# Patient Record
Sex: FEMALE | Race: WHITE | NOT HISPANIC OR LATINO | URBAN - METROPOLITAN AREA
[De-identification: names, ages, dates, MRNs, and addresses within clinical notes are randomized per-mention and may not be internally consistent; named-entity substitution may affect disease eponyms.]

---

## 2018-01-14 ENCOUNTER — INPATIENT (INPATIENT)
Facility: HOSPITAL | Age: 68
LOS: 3 days | Discharge: HOME | End: 2018-01-18
Attending: INTERNAL MEDICINE

## 2018-01-14 DIAGNOSIS — I10 ESSENTIAL (PRIMARY) HYPERTENSION: ICD-10-CM

## 2018-01-14 DIAGNOSIS — D49.6 NEOPLASM OF UNSPECIFIED BEHAVIOR OF BRAIN: ICD-10-CM

## 2018-01-14 DIAGNOSIS — E11.9 TYPE 2 DIABETES MELLITUS WITHOUT COMPLICATIONS: ICD-10-CM

## 2018-01-14 DIAGNOSIS — E78.5 HYPERLIPIDEMIA, UNSPECIFIED: ICD-10-CM

## 2018-01-14 DIAGNOSIS — R56.9 UNSPECIFIED CONVULSIONS: ICD-10-CM

## 2018-01-14 DIAGNOSIS — C34.90 MALIGNANT NEOPLASM OF UNSPECIFIED PART OF UNSPECIFIED BRONCHUS OR LUNG: ICD-10-CM

## 2018-01-22 DIAGNOSIS — J44.9 CHRONIC OBSTRUCTIVE PULMONARY DISEASE, UNSPECIFIED: ICD-10-CM

## 2018-01-22 DIAGNOSIS — R41.82 ALTERED MENTAL STATUS, UNSPECIFIED: ICD-10-CM

## 2018-01-22 DIAGNOSIS — E87.1 HYPO-OSMOLALITY AND HYPONATREMIA: ICD-10-CM

## 2018-01-22 DIAGNOSIS — Z98.2 PRESENCE OF CEREBROSPINAL FLUID DRAINAGE DEVICE: ICD-10-CM

## 2018-01-22 DIAGNOSIS — I10 ESSENTIAL (PRIMARY) HYPERTENSION: ICD-10-CM

## 2018-01-22 DIAGNOSIS — G40.909 EPILEPSY, UNSPECIFIED, NOT INTRACTABLE, WITHOUT STATUS EPILEPTICUS: ICD-10-CM

## 2018-01-22 DIAGNOSIS — E83.42 HYPOMAGNESEMIA: ICD-10-CM

## 2018-01-22 DIAGNOSIS — R50.9 FEVER, UNSPECIFIED: ICD-10-CM

## 2018-01-22 DIAGNOSIS — G93.40 ENCEPHALOPATHY, UNSPECIFIED: ICD-10-CM

## 2018-01-22 DIAGNOSIS — E11.9 TYPE 2 DIABETES MELLITUS WITHOUT COMPLICATIONS: ICD-10-CM

## 2018-02-08 ENCOUNTER — INPATIENT (INPATIENT)
Facility: HOSPITAL | Age: 68
LOS: 0 days | Discharge: HOME | End: 2018-02-09
Attending: INTERNAL MEDICINE

## 2018-02-08 VITALS
RESPIRATION RATE: 16 BRPM | SYSTOLIC BLOOD PRESSURE: 135 MMHG | DIASTOLIC BLOOD PRESSURE: 75 MMHG | TEMPERATURE: 97 F | HEART RATE: 68 BPM

## 2018-02-08 DIAGNOSIS — Z98.890 OTHER SPECIFIED POSTPROCEDURAL STATES: Chronic | ICD-10-CM

## 2018-02-08 DIAGNOSIS — R56.9 UNSPECIFIED CONVULSIONS: ICD-10-CM

## 2018-02-08 DIAGNOSIS — I10 ESSENTIAL (PRIMARY) HYPERTENSION: ICD-10-CM

## 2018-02-08 DIAGNOSIS — Z98.2 PRESENCE OF CEREBROSPINAL FLUID DRAINAGE DEVICE: Chronic | ICD-10-CM

## 2018-02-08 DIAGNOSIS — E11.9 TYPE 2 DIABETES MELLITUS WITHOUT COMPLICATIONS: ICD-10-CM

## 2018-02-08 DIAGNOSIS — Z90.2 ACQUIRED ABSENCE OF LUNG [PART OF]: Chronic | ICD-10-CM

## 2018-02-08 RX ORDER — DEXTROSE 50 % IN WATER 50 %
1 SYRINGE (ML) INTRAVENOUS ONCE
Qty: 0 | Refills: 0 | Status: DISCONTINUED | OUTPATIENT
Start: 2018-02-08 | End: 2018-02-09

## 2018-02-08 RX ORDER — ATORVASTATIN CALCIUM 80 MG/1
40 TABLET, FILM COATED ORAL AT BEDTIME
Qty: 0 | Refills: 0 | Status: DISCONTINUED | OUTPATIENT
Start: 2018-02-08 | End: 2018-02-09

## 2018-02-08 RX ORDER — DEXTROSE 50 % IN WATER 50 %
25 SYRINGE (ML) INTRAVENOUS ONCE
Qty: 0 | Refills: 0 | Status: DISCONTINUED | OUTPATIENT
Start: 2018-02-08 | End: 2018-02-09

## 2018-02-08 RX ORDER — HEPARIN SODIUM 5000 [USP'U]/ML
5000 INJECTION INTRAVENOUS; SUBCUTANEOUS EVERY 8 HOURS
Qty: 0 | Refills: 0 | Status: DISCONTINUED | OUTPATIENT
Start: 2018-02-08 | End: 2018-02-09

## 2018-02-08 RX ORDER — GLUCAGON INJECTION, SOLUTION 0.5 MG/.1ML
1 INJECTION, SOLUTION SUBCUTANEOUS ONCE
Qty: 0 | Refills: 0 | Status: DISCONTINUED | OUTPATIENT
Start: 2018-02-08 | End: 2018-02-09

## 2018-02-08 RX ORDER — ACETAMINOPHEN 500 MG
650 TABLET ORAL EVERY 6 HOURS
Qty: 0 | Refills: 0 | Status: DISCONTINUED | OUTPATIENT
Start: 2018-02-08 | End: 2018-02-09

## 2018-02-08 RX ORDER — INFLUENZA VIRUS VACCINE 15; 15; 15; 15 UG/.5ML; UG/.5ML; UG/.5ML; UG/.5ML
0.5 SUSPENSION INTRAMUSCULAR ONCE
Qty: 0 | Refills: 0 | Status: COMPLETED | OUTPATIENT
Start: 2018-02-08 | End: 2018-02-08

## 2018-02-08 RX ORDER — SODIUM CHLORIDE 9 MG/ML
1000 INJECTION, SOLUTION INTRAVENOUS
Qty: 0 | Refills: 0 | Status: DISCONTINUED | OUTPATIENT
Start: 2018-02-08 | End: 2018-02-09

## 2018-02-08 RX ORDER — LEVETIRACETAM 250 MG/1
250 TABLET, FILM COATED ORAL EVERY 12 HOURS
Qty: 0 | Refills: 0 | Status: DISCONTINUED | OUTPATIENT
Start: 2018-02-08 | End: 2018-02-09

## 2018-02-08 RX ORDER — METFORMIN HYDROCHLORIDE 850 MG/1
1000 TABLET ORAL
Qty: 0 | Refills: 0 | Status: DISCONTINUED | OUTPATIENT
Start: 2018-02-08 | End: 2018-02-09

## 2018-02-08 RX ORDER — INSULIN LISPRO 100/ML
VIAL (ML) SUBCUTANEOUS
Qty: 0 | Refills: 0 | Status: DISCONTINUED | OUTPATIENT
Start: 2018-02-08 | End: 2018-02-09

## 2018-02-08 RX ORDER — DEXTROSE 50 % IN WATER 50 %
12.5 SYRINGE (ML) INTRAVENOUS ONCE
Qty: 0 | Refills: 0 | Status: DISCONTINUED | OUTPATIENT
Start: 2018-02-08 | End: 2018-02-09

## 2018-02-08 RX ADMIN — HEPARIN SODIUM 5000 UNIT(S): 5000 INJECTION INTRAVENOUS; SUBCUTANEOUS at 18:48

## 2018-02-08 RX ADMIN — LEVETIRACETAM 250 MILLIGRAM(S): 250 TABLET, FILM COATED ORAL at 22:40

## 2018-02-08 NOTE — H&P ADULT - FAMILY HISTORY
Father  Still living? No  Family history of brain cancer, Age at diagnosis: Age Unknown     Mother  Still living? Unknown  Family history of lung cancer, Age at diagnosis: Age Unknown Father  Still living? No  Family history of brain cancer, Age at diagnosis: Age Unknown     Mother  Still living? No  Family history of lung cancer, Age at diagnosis: Age Unknown

## 2018-02-08 NOTE — H&P ADULT - HISTORY OF PRESENT ILLNESS
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  According to the son, patient has been having seizures since the year 2000, when the colloidal cyst was initially diagnosed. She remained stable on AED until 2005 when she had another seizure (colloidal cyst had increased in size, requiring surgery and subsequent placement of a VPS). She was maintained on Keppra and has been well until 2 weeks ago when she had stiffening of the left side of her body, recurrent episodes of Absence seizures, thus was admitted for VEEG for further evaluation.

## 2018-02-08 NOTE — H&P ADULT - NSHPSOCIALHISTORY_GEN_ALL_CORE
Lives at home with family: son.    No history of illicit drug use.  Denies Alcohol use.  Former smoker.

## 2018-02-08 NOTE — CONSULT NOTE ADULT - SUBJECTIVE AND OBJECTIVE BOX
CELINE MAURICIO     67y     Female    [id number]                                                           CC:Patient is a 67y old  Female who presents with a chief complaint of Admitted for VEEG (2018 12:02)      HPI:  History obtained from patient and son Rex [phone]  In  pt underwent right craniotomy for colloidal cyst resection. Pt was doing great until  when had a witnessed GTC seizure. Pt needed a revision, and underwent another resective surgery at that time. Pt was put on Keppra , then  in  V-P shunt was placed. Pt stopped taking Keppra about 5 years ago. In 2018 pt was admitted to [facility] for confusion/fever. That morning reportedly pt woke up confused. As the day progressed confusion got worse, and patient had intermittent left arm posturing and left-sided weakness. REEG was abnormal (see report), and patient was put on Keppra that she continues taking. Patient is tolerating Keppra well, compliant.    FAMILY HISTORY:  Family history of lung cancer (Mother): Mother: .  Family history of brain cancer (Father): Father; .      Vital Signs Last 24 Hrs  T(C): 36.1 (2018 12:44), Max: 36.1 (2018 12:44)  T(F): 97 (2018 12:44), Max: 97 (2018 12:44)  HR: 68 (2018 12:44) (68 - 68)  BP: 135/75 (2018 12:44) (135/75 - 135/75)  BP(mean): --  RR: 16 (2018 12:44) (16 - 16)  SpO2: --      Neuro Exam:  Orientation: oriented to person, place and time.   Attention: normal concentrating ability and visual attention was not decreased, follows 3-step directions  Language: no difficulty naming common objects, no difficulty repeating a phrase, no difficulty writing a sentence, fluency intact, comprehension intact.   Cranial Nerves: pupils equal round and reactive to light, extraocular motion intact, facial sensation intact symmetrically, face symmetrical, hearing was intact bilaterally, tongue and palate midline, head turning and shoulder shrug symmetric and there was no tongue deviation with protrusion.  Left visual field cut noted.  Motor: muscle tone was normal in all four extremities, muscle strength was normal in all four extremities and normal bulk in all four extremities.   Sensory exam: light touch was intact.   Cerebellar:  No pronator drift, FTN/HTS intact  Ambulating independently with some guarding of left LE, steady  Deep tendon reflexes: 2+, symmetrical      Allergies    codeine (Unknown)      Home Medications:  atorvastatin 40 mg oral tablet: 1 tab(s) orally once a day (2018 12:46)  glimepiride 4 mg oral tablet: 1 tab(s) orally once a day (2018 12:46)  Januvia 50 mg oral tablet: 1 tab(s) orally once a day (2018 12:46)  Keppra 250 mg oral tablet: 1 tab(s) orally 2 times a day (2018 12:46)  metFORMIN 1000 mg oral tablet: 1 tab(s) orally 2 times a day (2018 12:46)  valsartan 160 mg oral capsule: 1 cap(s) orally once a day (2018 12:46)  Norvasc 10 mg QHS      REEG 01/15/2018:  mild right fronto-central slowing  right fronto-central sharps-spikes    Neuro Imaging:  CT head 18  s/p right frontal and left fronto-parietal craniotomies  Stable left transfrontal ventricular shunt catheter  Stable encephalomalacia underlying right frontal lobe and bilateral occipital lobes  Chronic microvascular ischemic changes    Assessment: 67-year-old female with history of cilateral craniotomies for colloidal cyst resection, seizures, V-P shunt, CELINE MAURICIO     67y     Female    [id number]                                                           CC:  Patient is a 67y old  Female who presents for VEEG (2018 12:02)      HPI:  History obtained from patient and son Rex [phone]  In  pt underwent right craniotomy for colloidal cyst resection. Pt was doing great until  when had a witnessed GTC seizure. Pt needed a revision, and underwent another resective surgery at that time. Pt was put on Keppra , then  in  V-P shunt was placed. Pt stopped taking Keppra about 5 years ago. In 2018 pt was admitted to [facility] for confusion/fever. That morning reportedly pt woke up confused. As the day progressed confusion got worse, and patient had intermittent left arm posturing and left-sided weakness. REEG was abnormal (see report), and patient was put on Keppra that she continues taking. Patient is tolerating Keppra well, compliant, no other confusional episodes reported.    FAMILY HISTORY:  Family history of lung cancer (Mother): Mother: .  Family history of brain cancer (Father): Father; .      PMH:  See HPI  Diabetes mellitus    Hypertension    DLD        Past Surgical History:  History of craniotomy    S/P ventriculoperitoneal shunt    Status post partial lobectomy of lung.      Vital Signs Last 24 Hrs  T(C): 36.1 (2018 12:44), Max: 36.1 (2018 12:44)  T(F): 97 (2018 12:44), Max: 97 (2018 12:44)  HR: 68 (2018 12:44) (68 - 68)  BP: 135/75 (2018 12:44) (135/75 - 135/75)  BP(mean): --  RR: 16 (2018 12:44) (16 - 16)  SpO2: --      Neuro Exam:  Orientation: oriented to person, place and time.   Attention: normal concentrating ability and visual attention was not decreased, follows 3-step directions  Language: no difficulty naming common objects, no difficulty repeating a phrase, fluency intact, comprehension intact.   Cranial Nerves: pupils equal round and reactive to light, extraocular motion intact, facial sensation intact symmetrically, face symmetrical, hearing was intact bilaterally, tongue and palate midline, head turning and shoulder shrug symmetric and there was no tongue deviation with protrusion.  Left visual field cut noted.  Motor: muscle tone was normal in all four extremities, muscle strength was normal in all four extremities and normal bulk in all four extremities.   Sensory exam: decreased sensation on left side  Cerebellar:  No pronator drift, FTN/HTS intact  Ambulating independently with some guarding of left LE, steady  Deep tendon reflexes: 2+, symmetrical      Allergies    codeine (Unknown)      Home Medications:  atorvastatin 40 mg oral tablet: 1 tab(s) orally once a day (2018 12:46)  glimepiride 4 mg oral tablet: 1 tab(s) orally once a day (2018 12:46)  Januvia 50 mg oral tablet: 1 tab(s) orally once a day (2018 12:46)  Keppra 250 mg oral tablet: 1 tab(s) orally 2 times a day (2018 12:46)  metFORMIN 1000 mg oral tablet: 1 tab(s) orally 2 times a day (2018 12:46)  valsartan 160 mg oral capsule: 1 cap(s) orally once a day (2018 12:46)  Norvasc 10 mg QHS      REEG 01/15/2018:  mild right fronto-central slowing  right fronto-central sharps-spikes    Neuro Imaging:  CT head 18  s/p right frontal and left fronto-parietal craniotomies  Stable left transfrontal ventricular shunt catheter  Stable encephalomalacia underlying right frontal lobe and bilateral occipital lobes  Chronic microvascular ischemic changes      Assessment: 67-year-old left-handed female with history of two craniotomies for colloidal cyst resection, seizures, V-P shunt, Diabetes mellitus type 2, Hypertension, partial lobectomy of lung, s/p recent hospitalization for altered mental status when was started on Keppra.      Plan:  VEEG for better characterization and treatment plan  seizure precautions  Continue home dose of Keppra 250mg q12hrs (ordered)  check CBC, CMP, Mg, Keppra level trough  Kepp Mg above 2.0  Ativan 2mg IV PRN for GTC seizure lasting longer than 2 minutes ot two consecutive seizures without return to baseline in-between

## 2018-02-08 NOTE — H&P ADULT - NSHPPHYSICALEXAM_GEN_ALL_CORE
PHYSICAL EXAM:      Constitutional: well looking, in no acute distress.    Eyes: Left eye blindness.    ENMT: No oropharyngeal exudates or erythema, no sinus tenderness.    Neck: No JVD, Thyromegaly, lymphadenopathy or neck stiffness.    Respiratory: clear to auscultation bilaterally. No wheezing, rales or rubs heard.    Cardiovascular: Heart sounds 1 and 2 present, RRR.    Gastrointestinal: soft non-tender, non-distended, no masses palpated. BS present.    Extremities: No edema, clubbing or cyanosis, Pulses 2+.    Neurological: Awake and Alert, oriented x 3, no focal neurological deficits.

## 2018-02-08 NOTE — H&P ADULT - NSHPREVIEWOFSYSTEMS_GEN_ALL_CORE
REVIEW OF SYSTEMS      General:	    Skin/Breast:  	  Ophthalmologic:  	  ENMT:	    Respiratory and Thorax:  	  Cardiovascular:	    Gastrointestinal:	    Genitourinary:	    Musculoskeletal:	    Neurological:	    Psychiatric:	    Hematology/Lymphatics:	    Endocrine:	    Allergic/Immunologic: REVIEW OF SYSTEMS      General:	No fevers, chills, fatigue, weight loss.    Ophthalmologic: left eye blindness, no new visual changes or eye pain noted.  	  ENMT: No sore throat, ear pain, hearing loss.    Respiratory and Thorax: No dyspnea, cough, chest pain, sputum production, wheezing.  	  Cardiovascular: No PND, pedal edema, palpitations, orthopnea.	    Gastrointestinal:	No abdominal pain, nausea/vomiting, anorexia, change in bowel habits.    Genitourinary:	No dysuria, nocturia, frequency, back pain.    Musculoskeletal:	No joint pain or swelling.    Neurological:	No focal deficits, sensory loss or AMS.    Psychiatric:	No depression/anxiety.

## 2018-02-09 VITALS
RESPIRATION RATE: 16 BRPM | DIASTOLIC BLOOD PRESSURE: 65 MMHG | HEART RATE: 60 BPM | TEMPERATURE: 96 F | SYSTOLIC BLOOD PRESSURE: 142 MMHG

## 2018-02-09 DIAGNOSIS — E83.42 HYPOMAGNESEMIA: ICD-10-CM

## 2018-02-09 LAB
ALBUMIN SERPL ELPH-MCNC: 3.9 G/DL — SIGNIFICANT CHANGE UP (ref 3–5.5)
ALP SERPL-CCNC: 80 U/L — SIGNIFICANT CHANGE UP (ref 30–115)
ALT FLD-CCNC: 68 U/L — HIGH (ref 0–41)
ANION GAP SERPL CALC-SCNC: 13 MMOL/L — SIGNIFICANT CHANGE UP (ref 7–14)
AST SERPL-CCNC: 37 U/L — SIGNIFICANT CHANGE UP (ref 0–41)
BILIRUB SERPL-MCNC: 0.4 MG/DL — SIGNIFICANT CHANGE UP (ref 0.2–1.2)
BUN SERPL-MCNC: 13 MG/DL — SIGNIFICANT CHANGE UP (ref 10–20)
CALCIUM SERPL-MCNC: 9.4 MG/DL — SIGNIFICANT CHANGE UP (ref 8.5–10.1)
CHLORIDE SERPL-SCNC: 102 MMOL/L — SIGNIFICANT CHANGE UP (ref 98–110)
CO2 SERPL-SCNC: 22 MMOL/L — SIGNIFICANT CHANGE UP (ref 17–32)
CREAT SERPL-MCNC: 0.7 MG/DL — SIGNIFICANT CHANGE UP (ref 0.7–1.5)
GLUCOSE SERPL-MCNC: 138 MG/DL — HIGH (ref 70–110)
MAGNESIUM SERPL-MCNC: 1.5 MG/DL — LOW (ref 1.8–2.4)
PHOSPHATE SERPL-MCNC: 3.6 MG/DL — SIGNIFICANT CHANGE UP (ref 2.1–4.9)
POTASSIUM SERPL-MCNC: 3.9 MMOL/L — SIGNIFICANT CHANGE UP (ref 3.5–5)
POTASSIUM SERPL-SCNC: 3.9 MMOL/L — SIGNIFICANT CHANGE UP (ref 3.5–5)
PROT SERPL-MCNC: 6 G/DL — SIGNIFICANT CHANGE UP (ref 6–8)
SODIUM SERPL-SCNC: 137 MMOL/L — SIGNIFICANT CHANGE UP (ref 135–146)

## 2018-02-09 RX ORDER — LEVETIRACETAM 250 MG/1
1.5 TABLET, FILM COATED ORAL
Qty: 21 | Refills: 0 | OUTPATIENT
Start: 2018-02-09 | End: 2018-02-15

## 2018-02-09 RX ORDER — ACETAMINOPHEN 500 MG
2 TABLET ORAL
Qty: 0 | Refills: 0 | COMMUNITY
Start: 2018-02-09

## 2018-02-09 RX ORDER — LEVETIRACETAM 250 MG/1
1 TABLET, FILM COATED ORAL
Qty: 0 | Refills: 0 | COMMUNITY

## 2018-02-09 RX ORDER — MAGNESIUM OXIDE 400 MG ORAL TABLET 241.3 MG
400 TABLET ORAL ONCE
Qty: 0 | Refills: 0 | Status: DISCONTINUED | OUTPATIENT
Start: 2018-02-09 | End: 2018-02-09

## 2018-02-09 RX ORDER — LEVETIRACETAM 250 MG/1
1 TABLET, FILM COATED ORAL
Qty: 120 | Refills: 3 | OUTPATIENT
Start: 2018-02-09

## 2018-02-09 RX ORDER — MAGNESIUM SULFATE 500 MG/ML
2 VIAL (ML) INJECTION ONCE
Qty: 0 | Refills: 0 | Status: COMPLETED | OUTPATIENT
Start: 2018-02-09 | End: 2018-02-09

## 2018-02-09 RX ORDER — MAGNESIUM OXIDE 400 MG ORAL TABLET 241.3 MG
400 TABLET ORAL
Qty: 0 | Refills: 0 | Status: DISCONTINUED | OUTPATIENT
Start: 2018-02-09 | End: 2018-02-09

## 2018-02-09 RX ORDER — MAGNESIUM OXIDE 400 MG ORAL TABLET 241.3 MG
1 TABLET ORAL
Qty: 12 | Refills: 0 | OUTPATIENT
Start: 2018-02-09 | End: 2018-02-12

## 2018-02-09 RX ORDER — LEVETIRACETAM 250 MG/1
375 TABLET, FILM COATED ORAL EVERY 12 HOURS
Qty: 0 | Refills: 0 | Status: DISCONTINUED | OUTPATIENT
Start: 2018-02-09 | End: 2018-02-09

## 2018-02-09 RX ADMIN — METFORMIN HYDROCHLORIDE 1000 MILLIGRAM(S): 850 TABLET ORAL at 07:59

## 2018-02-09 RX ADMIN — Medication 50 GRAM(S): at 12:59

## 2018-02-09 RX ADMIN — Medication 650 MILLIGRAM(S): at 09:05

## 2018-02-09 RX ADMIN — LEVETIRACETAM 375 MILLIGRAM(S): 250 TABLET, FILM COATED ORAL at 11:02

## 2018-02-09 NOTE — DISCHARGE NOTE ADULT - MEDICATION SUMMARY - MEDICATIONS TO TAKE
I will START or STAY ON the medications listed below when I get home from the hospital:    acetaminophen 325 mg oral tablet  -- 2 tab(s) by mouth every 6 hours, As needed, For Temp greater than 38.5 C (101.3 F)  -- Indication: For Pain    valsartan 160 mg oral capsule  -- 1 cap(s) by mouth once a day  -- Indication: For Hypertension    Keppra 250 mg oral tablet  -- 1.5 tab(s) by mouth every 12 hours   -- Check with your doctor before becoming pregnant.  It is very important that you take or use this exactly as directed.  Do not skip doses or discontinue unless directed by your doctor.  May cause drowsiness or dizziness.  Obtain medical advice before taking any non-prescription drugs as some may affect the action of this medication.  Swallow whole.  Do not crush.  This drug may impair the ability to drive or operate machinery.  Use care until you become familiar with its effects.    -- Indication: For Seizures    Keppra 500 mg oral tablet  -- 1 tab(s) by mouth every 12 hours   -- Check with your doctor before becoming pregnant.  It is very important that you take or use this exactly as directed.  Do not skip doses or discontinue unless directed by your doctor.  May cause drowsiness or dizziness.  Obtain medical advice before taking any non-prescription drugs as some may affect the action of this medication.  Swallow whole.  Do not crush.  This drug may impair the ability to drive or operate machinery.  Use care until you become familiar with its effects.    -- Indication: For Seizures    metFORMIN 1000 mg oral tablet  -- 1 tab(s) by mouth 2 times a day  -- Indication: For Diabetes mellitus    Januvia 50 mg oral tablet  -- 1 tab(s) by mouth once a day  -- Indication: For Diabetes mellitus    glimepiride 4 mg oral tablet  -- 1 tab(s) by mouth once a day  -- Indication: For Diabetes mellitus    atorvastatin 40 mg oral tablet  -- 1 tab(s) by mouth once a day  -- Indication: For Dyslipidemia    magnesium oxide 400 mg (241.3 mg elemental magnesium) oral tablet  -- 1 tab(s) by mouth 3 times a day (with meals)  -- Indication: For Hypomagnesemia I will START or STAY ON the medications listed below when I get home from the hospital:    acetaminophen 325 mg oral tablet  -- 2 tab(s) by mouth every 6 hours, As needed, For Temp greater than 38.5 C (101.3 F)  -- Indication: For Pain    valsartan 160 mg oral capsule  -- 1 cap(s) by mouth once a day  -- Indication: For Hypertension    Keppra 250 mg oral tablet  -- 1.5 tab(s) by mouth every 12 hours   -- Check with your doctor before becoming pregnant.  It is very important that you take or use this exactly as directed.  Do not skip doses or discontinue unless directed by your doctor.  May cause drowsiness or dizziness.  Obtain medical advice before taking any non-prescription drugs as some may affect the action of this medication.  Swallow whole.  Do not crush.  This drug may impair the ability to drive or operate machinery.  Use care until you become familiar with its effects.    -- Indication: For Seizures    metFORMIN 1000 mg oral tablet  -- 1 tab(s) by mouth 2 times a day  -- Indication: For Diabetes mellitus    Januvia 50 mg oral tablet  -- 1 tab(s) by mouth once a day  -- Indication: For Diabetes mellitus    glimepiride 4 mg oral tablet  -- 1 tab(s) by mouth once a day  -- Indication: For Diabetes mellitus    atorvastatin 40 mg oral tablet  -- 1 tab(s) by mouth once a day  -- Indication: For Dyslipidemia    magnesium oxide 400 mg (241.3 mg elemental magnesium) oral tablet  -- 1 tab(s) by mouth 3 times a day (with meals)  -- Indication: For Hypomagnesemia

## 2018-02-09 NOTE — PROGRESS NOTE ADULT - PROBLEM SELECTOR PLAN 1
On VEEG  Neurology following  Continue Resume Keppra 250 mg q12h  Ativan prn for seizures.  Seizure precautions. On VEEG: no clinical seizures.   BACKGROUND:8-9 Hz superimposed by right hemispheric mainly fronto-temporal focal slowing.  INTERICTAL ACTIVITY: right FTC sharps and sharp transients  Neurology following: recommends Keppra 375 mg q12h for 1 week then 500mg q12hr. Follow up appointment date 04/09/18 at 15:20 PM Dr. Mcdonough.

## 2018-02-09 NOTE — PROGRESS NOTE ADULT - SUBJECTIVE AND OBJECTIVE BOX
CELINE MAURICIO  67y  Female      Patient is a 67y old  Female who presents with a chief complaint of Admitted for VEEG (08 Feb 2018 12:02)      INTERVAL HPI/OVERNIGHT EVENTS: None      REVIEW OF SYSTEMS:  CONSTITUTIONAL: No fever, weight loss, or fatigue  EYES: left eye blindness.  RESPIRATORY: No cough, wheezing, chills or hemoptysis; No shortness of breath  CARDIOVASCULAR: No chest pain, palpitations, dizziness, or leg swelling  GASTROINTESTINAL: No abdominal or epigastric pain. No nausea, vomiting, or hematemesis; No diarrhea or constipation. No melena or hematochezia.  GENITOURINARY: No dysuria, frequency, hematuria, or incontinence  NEUROLOGICAL: No headaches, memory loss, loss of strength, numbness, or tremors    All other systems: nil of note.    Vital Signs (24 Hrs):  T(C): 36.5 (02-08-18 @ 23:35), Max: 36.5 (02-08-18 @ 23:35)  HR: 65 (02-08-18 @ 23:35) (65 - 68)  BP: 146/60 (02-08-18 @ 23:35) (124/62 - 146/60)  RR: 16 (02-08-18 @ 23:35) (16 - 16)  SpO2: --        PHYSICAL EXAM:  GENERAL: NAD, well-groomed, well-developed  HEAD:  Atraumatic, Normocephalic  EYES: conjunctiva and sclera clear  NECK: Supple, No JVD, Normal thyroid  NERVOUS SYSTEM:  Alert & Oriented X3, Good concentration; Motor Strength 5/5 B/L upper and lower extremities.  CHEST/LUNG: Clear to percussion bilaterally; No rales, rhonchi, wheezing, or rubs  HEART: Regular rate and rhythm; No murmurs, rubs, or gallops  ABDOMEN: Soft, Nontender, Nondistended; Bowel sounds present  EXTREMITIES:  2+ Peripheral Pulses, No clubbing, cyanosis, or edema  SKIN: see nursing assessing assessment.    Consultant(s) Notes Reviewed:  [x ] YES  [ ] NO  Care Discussed with Consultants/Other Providers [ x] YES  [ ] NO    LABS:  02-08    137  |  102  |  13  ----------------------------<  138<H>  3.9   |  22  |  0.7    Ca    9.4      08 Feb 2018 15:46  Phos  3.6     02-08  Mg     1.5     02-08    TPro  6.0  /  Alb  3.9  /  TBili  0.4  /  DBili  x   /  AST  37  /  ALT  68<H>  /  AlkPhos  80  02-08      RADIOLOGY & ADDITIONAL TESTS: No new imaging.  Head CT: 1/14/18  s/p right frontal and left fronto-parietal craniotomies  Stable left transfrontal ventricular shunt catheter  Stable encephalomalacia underlying right frontal lobe and bilateral occipital lobes  Chronic microvascular ischemic changes      MEDICATIONS  (STANDING):  atorvastatin 40 milliGRAM(s) Oral at bedtime  dextrose 5%. 1000 milliLiter(s) (50 mL/Hr) IV Continuous <Continuous>  dextrose 50% Injectable 12.5 Gram(s) IV Push once  dextrose 50% Injectable 25 Gram(s) IV Push once  dextrose 50% Injectable 25 Gram(s) IV Push once  heparin  Injectable 5000 Unit(s) SubCutaneous every 8 hours  insulin lispro (HumaLOG) corrective regimen sliding scale   SubCutaneous three times a day before meals  levETIRAcetam 250 milliGRAM(s) Oral every 12 hours  metFORMIN 1000 milliGRAM(s) Oral two times a day with meals    MEDICATIONS  (PRN):  acetaminophen   Tablet 650 milliGRAM(s) Oral every 6 hours PRN For Temp greater than 38.5 C (101.3 F)  dextrose Gel 1 Dose(s) Oral once PRN Blood Glucose LESS THAN 70 milliGRAM(s)/deciliter  glucagon  Injectable 1 milliGRAM(s) IntraMuscular once PRN Glucose LESS THAN 70 milligrams/deciliter      HEALTH ISSUES - PROBLEM Dx:  Hypertension  Non Insulin Dependent Diabetes mellitus.  Seizures  h/o Lung Cancer s/p Partial lobectomy  s/p VPS placement  s/p Craniotomy  s/p Colloidal cyst resection CELINE MAURICIO  67y  Female      Patient is a 67y old  Female who presents with a chief complaint of Admitted for VEEG (08 Feb 2018 12:02)      INTERVAL HPI/OVERNIGHT EVENTS: None      REVIEW OF SYSTEMS:  CONSTITUTIONAL: No fever, weight loss, or fatigue  EYES: left eye blindness.  RESPIRATORY: No cough, wheezing, chills or hemoptysis; No shortness of breath  CARDIOVASCULAR: No chest pain, palpitations, dizziness, or leg swelling  GASTROINTESTINAL: No abdominal or epigastric pain. No nausea, vomiting, or hematemesis; No diarrhea or constipation. No melena or hematochezia.  GENITOURINARY: No dysuria, frequency, hematuria, or incontinence  NEUROLOGICAL: No headaches, patient has Short term memory loss, loss of strength, numbness, or tremors    All other systems: nil of note.    Vital Signs (24 Hrs):  T(C): 36.5 (02-08-18 @ 23:35), Max: 36.5 (02-08-18 @ 23:35)  HR: 65 (02-08-18 @ 23:35) (65 - 68)  BP: 146/60 (02-08-18 @ 23:35) (124/62 - 146/60)  RR: 16 (02-08-18 @ 23:35) (16 - 16)  SpO2: --        PHYSICAL EXAM:  GENERAL: NAD, well-groomed, well-developed  HEAD:  Atraumatic, Normocephalic  EYES: conjunctiva and sclera clear  NECK: Supple, No JVD, Normal thyroid  NERVOUS SYSTEM:  Alert & Oriented X3, Motor Strength 5/5 B/L upper and lower extremities.  CHEST/LUNG: Clear to percussion bilaterally; No rales, rhonchi, wheezing, or rubs  HEART: Regular rate and rhythm; No murmurs, rubs, or gallops  ABDOMEN: Soft, Nontender, Nondistended; Bowel sounds present  EXTREMITIES:  2+ Peripheral Pulses, No clubbing, cyanosis, or edema  SKIN: see nursing assessing assessment.    Consultant(s) Notes Reviewed:  [x ] YES  [ ] NO  Care Discussed with Consultants/Other Providers [ x] YES  [ ] NO    LABS:  02-08    137  |  102  |  13  ----------------------------<  138<H>  3.9   |  22  |  0.7    Ca    9.4      08 Feb 2018 15:46  Phos  3.6     02-08  Mg     1.5     02-08    TPro  6.0  /  Alb  3.9  /  TBili  0.4  /  DBili  x   /  AST  37  /  ALT  68<H>  /  AlkPhos  80  02-08      RADIOLOGY & ADDITIONAL TESTS: No new imaging.  Head CT: 1/14/18  s/p right frontal and left fronto-parietal craniotomies  Stable left transfrontal ventricular shunt catheter  Stable encephalomalacia underlying right frontal lobe and bilateral occipital lobes  Chronic microvascular ischemic changes      MEDICATIONS  (STANDING):  atorvastatin 40 milliGRAM(s) Oral at bedtime  dextrose 5%. 1000 milliLiter(s) (50 mL/Hr) IV Continuous <Continuous>  dextrose 50% Injectable 12.5 Gram(s) IV Push once  dextrose 50% Injectable 25 Gram(s) IV Push once  dextrose 50% Injectable 25 Gram(s) IV Push once  heparin  Injectable 5000 Unit(s) SubCutaneous every 8 hours  insulin lispro (HumaLOG) corrective regimen sliding scale   SubCutaneous three times a day before meals  levETIRAcetam 250 milliGRAM(s) Oral every 12 hours  metFORMIN 1000 milliGRAM(s) Oral two times a day with meals    MEDICATIONS  (PRN):  acetaminophen   Tablet 650 milliGRAM(s) Oral every 6 hours PRN For Temp greater than 38.5 C (101.3 F)  dextrose Gel 1 Dose(s) Oral once PRN Blood Glucose LESS THAN 70 milliGRAM(s)/deciliter  glucagon  Injectable 1 milliGRAM(s) IntraMuscular once PRN Glucose LESS THAN 70 milligrams/deciliter      HEALTH ISSUES - PROBLEM Dx:  Hypertension  Non Insulin Dependent Diabetes mellitus.  Seizures  h/o Lung Cancer s/p Partial lobectomy  s/p VPS placement  s/p Craniotomy  s/p Colloidal cyst resection

## 2018-02-09 NOTE — DISCHARGE NOTE ADULT - CARE PLAN
Principal Discharge DX:	Seizures  Goal:	Adequate management to prevent recurrence  Assessment and plan of treatment:	Take medications as prescribed. Your VEEG was completed with no seizure like activity seen.  Keppra dose has been increased. Take 375 mg every 12 hours for 1 week then 500 mg every 12 hours until you see Dr. Mcdonough on April 9th, 2018 at 15: 20 PM.

## 2018-02-09 NOTE — DISCHARGE NOTE ADULT - CARE PROVIDER_API CALL
Douglas Mcdonough), Neurology  [address] [place] [address]  Phone: [phone]  Fax: [phone]

## 2018-02-09 NOTE — PROGRESS NOTE ADULT - PROBLEM SELECTOR PLAN 2
Continue Metformin and Correctional dose insulin.  ? A1c Resume Metformin/Januvia and Glimepride.  ? A1c

## 2018-02-09 NOTE — DISCHARGE NOTE ADULT - PATIENT PORTAL LINK FT
You can access the [facility] Portal, offered by [facility], by registering with the following website: [email or web address]

## 2018-02-09 NOTE — DISCHARGE NOTE ADULT - MEDICATION SUMMARY - MEDICATIONS TO CHANGE
I will SWITCH the dose or number of times a day I take the medications listed below when I get home from the hospital:    Keppra 250 mg oral tablet  -- take one and a half tablet by mouth every 12 hours for 7 days, then increase to two tablets every 12 hours  -- Check with your doctor before becoming pregnant.  It is very important that you take or use this exactly as directed.  Do not skip doses or discontinue unless directed by your doctor.  May cause drowsiness or dizziness.  Obtain medical advice before taking any non-prescription drugs as some may affect the action of this medication.  Swallow whole.  Do not crush.  This drug may impair the ability to drive or operate machinery.  Use care until you become familiar with its effects.

## 2018-02-09 NOTE — DISCHARGE NOTE ADULT - PLAN OF CARE
Adequate management to prevent recurrence Take medications as prescribed. Your VEEG was completed with no seizure like activity seen.  Keppra dose has been increased. Take 375 mg every 12 hours for 1 week then 500 mg every 12 hours until you see Dr. Mcdonough on April 9th, 2018 at 15: 20 PM.

## 2018-02-09 NOTE — DISCHARGE NOTE ADULT - MEDICATION SUMMARY - MEDICATIONS TO STOP TAKING
I will STOP taking the medications listed below when I get home from the hospital:    Keppra 250 mg oral tablet  -- 1 tab(s) by mouth 2 times a day

## 2018-02-09 NOTE — DISCHARGE NOTE ADULT - HOSPITAL COURSE
67 year old Female with h/o colloidal cyst resection ( s/p craniotomy x 2), seizures, V-P shunt, NIDDM, Hypertension, Lung cancer s/p partial lobectomy of lung, admitted for VEEG.       Problem/Plan - 1:  ·  Seizures.  Plan: Completed VEEG: no clinical seizures.   BACKGROUND:8-9 Hz superimposed by right hemispheric mainly fronto-temporal focal slowing.  INTERICTAL ACTIVITY: right FTC sharps and sharp transients  Neurology following: recommends Keppra 375 mg q12h for 1 week then 500mg q12hr. Follow up appointment date 04/09/18 at 15:20 PM Dr. Mcdonough.     Problem/Plan - 2:  ·  Diabetes mellitus.  Plan: Resume Metformin/Januvia and Glimepride.       Problem/Plan - 3:  ·  Hypertension.  Plan: Continue Valsartan.      Problem/Plan - 4:  ·  Hypomagnesemia.  Plan: Prescribed Mag oxide. Follow up Mag levels.   STOP taking if diarrhea occurs.

## 2018-02-09 NOTE — PROGRESS NOTE ADULT - SUBJECTIVE AND OBJECTIVE BOX
Neurology Progress Note  CELINE MAURICIO  [id number]  [id number]    Vital Signs Last 24 Hrs  T(C): 35.6 (09 Feb 2018 07:14), Max: 36.5 (08 Feb 2018 23:35)  T(F): 96.1 (09 Feb 2018 07:14), Max: 97.7 (08 Feb 2018 23:35)  HR: 60 (09 Feb 2018 07:14) (60 - 68)  BP: 142/65 (09 Feb 2018 07:14) (124/62 - 146/60)  BP(mean): --  RR: 16 (09 Feb 2018 07:14) (16 - 16)  SpO2: --    VIDEO/EEG MONITORING LAST 24HRS:     BACKGROUND:  8-9 Hz superimposed by right hemispheric mainly fronto-temporal focal slowing    INTERICTAL ACTIVITY:    --right FTC sharps and sharp transients    SEIZURES:   --No    EVENTS:  --no events reported    AED LEVELS:    ---pending    PLAN:    D/C the monitoring discharge on 375 mg of keppra bid and after one week increase to 500 bid  follow up in 8 weeks with blood level a week prior to that  The findings and plans were discussed with the son    Neurological Exam:   Mental status: Awake, alert and oriented x3.  Recent and remote memory intact.  Naming, repetition and comprehension intact.  Attention/concentration intact.  No dysarthria, no aphasia.  Fund of knowledge appropriate.    Cranial nerves: Pupils equally round and reactive to light, visual fields full, no nystagmus, extraocular muscles intact, V1 through V3 intact bilaterally and symmetric, face symmetric, hearing intact to finger rub, palate elevation symmetric, tongue was midline.  Motor:  MRC grading 5/5 b/l UE/LE.   strength 5/5.  Normal tone and bulk.  No abnormal movements.    Sensation: Intact to light touch, proprioception, and pinprick.   Coordination: No dysmetria on finger-to-nose and heel-to-shin.  No dysdiadokinesia.  Reflexes: 2+ in bilateral UE/LE, downgoing toes bilaterally. (-) [place].  Gait: Narrow and steady. No ataxia.  Romberg negative    Medications:      Labs:  02-08    137  |  102  |  13  ----------------------------<  138<H>  3.9   |  22  |  0.7    Ca    9.4      08 Feb 2018 15:46  Phos  3.6     02-08  Mg     1.5     02-08    TPro  6.0  /  Alb  3.9  /  TBili  0.4  /  DBili  x   /  AST  37  /  ALT  68<H>  /  AlkPhos  80  02-08    LIVER FUNCTIONS - ( 08 Feb 2018 15:46 )  Alb: 3.9 g/dL / Pro: 6.0 g/dL / ALK PHOS: 80 U/L / ALT: 68 U/L / AST: 37 U/L / GGT: x

## 2018-02-09 NOTE — PROGRESS NOTE ADULT - ASSESSMENT
67 year old Female with h/o colloidal cyst resection ( s/p craniotomy x 2), seizures, V-P shunt, NIDDM, Hypertension, Lung cancer s/p partial lobectomy of lung, admitted for VEEG.

## 2018-02-09 NOTE — DISCHARGE NOTE ADULT - FINDINGS/TREATMENT
BACKGROUND:8-9 Hz superimposed by right hemispheric mainly fronto-temporal focal slowing.  INTERICTAL ACTIVITY: right FTC sharps and sharp transients

## 2018-02-10 LAB — LEVETIRACETAM SERPL-MCNC: <2 MCG/ML — LOW (ref 12–46)

## 2018-02-11 LAB
ESTIMATED AVERAGE GLUCOSE: 174 MG/DL — HIGH (ref 68–114)
HBA1C BLD-MCNC: 7.7 % — HIGH (ref 4–5.6)

## 2018-02-16 RX ORDER — LEVETIRACETAM 250 MG/1
1 TABLET, FILM COATED ORAL
Qty: 60 | Refills: 2 | OUTPATIENT
Start: 2018-02-16 | End: 2018-05-16

## 2018-02-20 DIAGNOSIS — Z03.89 ENCOUNTER FOR OBSERVATION FOR OTHER SUSPECTED DISEASES AND CONDITIONS RULED OUT: ICD-10-CM

## 2018-02-20 DIAGNOSIS — Z98.2 PRESENCE OF CEREBROSPINAL FLUID DRAINAGE DEVICE: ICD-10-CM

## 2018-02-20 DIAGNOSIS — Z80.8 FAMILY HISTORY OF MALIGNANT NEOPLASM OF OTHER ORGANS OR SYSTEMS: ICD-10-CM

## 2018-02-20 DIAGNOSIS — E11.9 TYPE 2 DIABETES MELLITUS WITHOUT COMPLICATIONS: ICD-10-CM

## 2018-02-20 DIAGNOSIS — Z85.118 PERSONAL HISTORY OF OTHER MALIGNANT NEOPLASM OF BRONCHUS AND LUNG: ICD-10-CM

## 2018-02-20 DIAGNOSIS — E78.5 HYPERLIPIDEMIA, UNSPECIFIED: ICD-10-CM

## 2018-02-20 DIAGNOSIS — Z79.84 LONG TERM (CURRENT) USE OF ORAL HYPOGLYCEMIC DRUGS: ICD-10-CM

## 2018-02-20 DIAGNOSIS — G40.802 OTHER EPILEPSY, NOT INTRACTABLE, WITHOUT STATUS EPILEPTICUS: ICD-10-CM

## 2018-02-20 DIAGNOSIS — H54.62 UNQUALIFIED VISUAL LOSS, LEFT EYE, NORMAL VISION RIGHT EYE: ICD-10-CM

## 2018-02-20 DIAGNOSIS — Z87.891 PERSONAL HISTORY OF NICOTINE DEPENDENCE: ICD-10-CM

## 2018-02-20 DIAGNOSIS — I10 ESSENTIAL (PRIMARY) HYPERTENSION: ICD-10-CM

## 2018-02-20 DIAGNOSIS — Z80.1 FAMILY HISTORY OF MALIGNANT NEOPLASM OF TRACHEA, BRONCHUS AND LUNG: ICD-10-CM

## 2018-02-20 DIAGNOSIS — E83.42 HYPOMAGNESEMIA: ICD-10-CM

## 2018-02-20 DIAGNOSIS — Z90.2 ACQUIRED ABSENCE OF LUNG [PART OF]: ICD-10-CM

## 2018-07-31 ENCOUNTER — INPATIENT (INPATIENT)
Facility: HOSPITAL | Age: 68
LOS: 2 days | Discharge: NURSING, REHAB OR LONG-TERM CARE | End: 2018-08-03
Attending: INTERNAL MEDICINE | Admitting: INTERNAL MEDICINE

## 2018-07-31 VITALS
OXYGEN SATURATION: 94 % | RESPIRATION RATE: 20 BRPM | HEIGHT: 62 IN | DIASTOLIC BLOOD PRESSURE: 59 MMHG | TEMPERATURE: 98 F | HEART RATE: 85 BPM | SYSTOLIC BLOOD PRESSURE: 123 MMHG | WEIGHT: 184.97 LBS

## 2018-07-31 DIAGNOSIS — F17.210 NICOTINE DEPENDENCE, CIGARETTES, UNCOMPLICATED: ICD-10-CM

## 2018-07-31 DIAGNOSIS — Z98.2 PRESENCE OF CEREBROSPINAL FLUID DRAINAGE DEVICE: Chronic | ICD-10-CM

## 2018-07-31 DIAGNOSIS — E11.9 TYPE 2 DIABETES MELLITUS WITHOUT COMPLICATIONS: ICD-10-CM

## 2018-07-31 DIAGNOSIS — R53.1 WEAKNESS: ICD-10-CM

## 2018-07-31 DIAGNOSIS — Z90.2 ACQUIRED ABSENCE OF LUNG [PART OF]: Chronic | ICD-10-CM

## 2018-07-31 DIAGNOSIS — Z98.890 OTHER SPECIFIED POSTPROCEDURAL STATES: Chronic | ICD-10-CM

## 2018-07-31 DIAGNOSIS — R56.9 UNSPECIFIED CONVULSIONS: ICD-10-CM

## 2018-07-31 DIAGNOSIS — I10 ESSENTIAL (PRIMARY) HYPERTENSION: ICD-10-CM

## 2018-07-31 LAB
ALBUMIN SERPL ELPH-MCNC: 4.5 G/DL — SIGNIFICANT CHANGE UP (ref 3.5–5.2)
ALP SERPL-CCNC: 107 U/L — SIGNIFICANT CHANGE UP (ref 30–115)
ALT FLD-CCNC: 41 U/L — SIGNIFICANT CHANGE UP (ref 0–41)
ANION GAP SERPL CALC-SCNC: 18 MMOL/L — HIGH (ref 7–14)
AST SERPL-CCNC: 22 U/L — SIGNIFICANT CHANGE UP (ref 0–41)
BASOPHILS # BLD AUTO: 0.03 K/UL — SIGNIFICANT CHANGE UP (ref 0–0.2)
BASOPHILS NFR BLD AUTO: 0.3 % — SIGNIFICANT CHANGE UP (ref 0–1)
BILIRUB SERPL-MCNC: 0.4 MG/DL — SIGNIFICANT CHANGE UP (ref 0.2–1.2)
BUN SERPL-MCNC: 13 MG/DL — SIGNIFICANT CHANGE UP (ref 10–20)
CALCIUM SERPL-MCNC: 9.6 MG/DL — SIGNIFICANT CHANGE UP (ref 8.5–10.1)
CHLORIDE SERPL-SCNC: 100 MMOL/L — SIGNIFICANT CHANGE UP (ref 98–110)
CK MB CFR SERPL CALC: 4.5 NG/ML — SIGNIFICANT CHANGE UP (ref 0.6–6.3)
CK MB CFR SERPL CALC: 7.2 NG/ML — HIGH (ref 0.6–6.3)
CK SERPL-CCNC: 195 U/L — SIGNIFICANT CHANGE UP (ref 0–225)
CK SERPL-CCNC: 441 U/L — HIGH (ref 0–225)
CO2 SERPL-SCNC: 22 MMOL/L — SIGNIFICANT CHANGE UP (ref 17–32)
CREAT SERPL-MCNC: 1 MG/DL — SIGNIFICANT CHANGE UP (ref 0.7–1.5)
D DIMER BLD IA.RAPID-MCNC: 240 NG/ML DDU — HIGH (ref 0–230)
EOSINOPHIL # BLD AUTO: 0.04 K/UL — SIGNIFICANT CHANGE UP (ref 0–0.7)
EOSINOPHIL NFR BLD AUTO: 0.5 % — SIGNIFICANT CHANGE UP (ref 0–8)
GLUCOSE SERPL-MCNC: 213 MG/DL — HIGH (ref 70–99)
HCT VFR BLD CALC: 43.5 % — SIGNIFICANT CHANGE UP (ref 37–47)
HGB BLD-MCNC: 15 G/DL — SIGNIFICANT CHANGE UP (ref 12–16)
IMM GRANULOCYTES NFR BLD AUTO: 0.3 % — SIGNIFICANT CHANGE UP (ref 0.1–0.3)
LYMPHOCYTES # BLD AUTO: 0.88 K/UL — LOW (ref 1.2–3.4)
LYMPHOCYTES # BLD AUTO: 10.1 % — LOW (ref 20.5–51.1)
MCHC RBC-ENTMCNC: 29.8 PG — SIGNIFICANT CHANGE UP (ref 27–31)
MCHC RBC-ENTMCNC: 34.5 G/DL — SIGNIFICANT CHANGE UP (ref 32–37)
MCV RBC AUTO: 86.5 FL — SIGNIFICANT CHANGE UP (ref 81–99)
MONOCYTES # BLD AUTO: 0.47 K/UL — SIGNIFICANT CHANGE UP (ref 0.1–0.6)
MONOCYTES NFR BLD AUTO: 5.4 % — SIGNIFICANT CHANGE UP (ref 1.7–9.3)
NEUTROPHILS # BLD AUTO: 7.25 K/UL — HIGH (ref 1.4–6.5)
NEUTROPHILS NFR BLD AUTO: 83.4 % — HIGH (ref 42.2–75.2)
NRBC # BLD: 0 /100 WBCS — SIGNIFICANT CHANGE UP (ref 0–0)
NT-PROBNP SERPL-SCNC: 42 PG/ML — SIGNIFICANT CHANGE UP (ref 0–300)
PLATELET # BLD AUTO: 231 K/UL — SIGNIFICANT CHANGE UP (ref 130–400)
POTASSIUM SERPL-MCNC: 4.2 MMOL/L — SIGNIFICANT CHANGE UP (ref 3.5–5)
POTASSIUM SERPL-SCNC: 4.2 MMOL/L — SIGNIFICANT CHANGE UP (ref 3.5–5)
PROT SERPL-MCNC: 6.6 G/DL — SIGNIFICANT CHANGE UP (ref 6–8)
RBC # BLD: 5.03 M/UL — SIGNIFICANT CHANGE UP (ref 4.2–5.4)
RBC # FLD: 13 % — SIGNIFICANT CHANGE UP (ref 11.5–14.5)
SODIUM SERPL-SCNC: 140 MMOL/L — SIGNIFICANT CHANGE UP (ref 135–146)
TROPONIN T SERPL-MCNC: 0.04 NG/ML — CRITICAL HIGH
TROPONIN T SERPL-MCNC: 0.1 NG/ML — CRITICAL HIGH
WBC # BLD: 8.7 K/UL — SIGNIFICANT CHANGE UP (ref 4.8–10.8)
WBC # FLD AUTO: 8.7 K/UL — SIGNIFICANT CHANGE UP (ref 4.8–10.8)

## 2018-07-31 RX ORDER — AMLODIPINE BESYLATE 2.5 MG/1
10 TABLET ORAL DAILY
Qty: 0 | Refills: 0 | Status: DISCONTINUED | OUTPATIENT
Start: 2018-07-31 | End: 2018-08-03

## 2018-07-31 RX ORDER — DEXTROSE 50 % IN WATER 50 %
25 SYRINGE (ML) INTRAVENOUS ONCE
Qty: 0 | Refills: 0 | Status: DISCONTINUED | OUTPATIENT
Start: 2018-07-31 | End: 2018-08-03

## 2018-07-31 RX ORDER — INSULIN LISPRO 100/ML
5 VIAL (ML) SUBCUTANEOUS
Qty: 0 | Refills: 0 | Status: DISCONTINUED | OUTPATIENT
Start: 2018-07-31 | End: 2018-08-03

## 2018-07-31 RX ORDER — GLIMEPIRIDE 1 MG
1 TABLET ORAL
Qty: 0 | Refills: 0 | COMMUNITY

## 2018-07-31 RX ORDER — METFORMIN HYDROCHLORIDE 850 MG/1
1 TABLET ORAL
Qty: 0 | Refills: 0 | COMMUNITY

## 2018-07-31 RX ORDER — LOSARTAN POTASSIUM 100 MG/1
100 TABLET, FILM COATED ORAL DAILY
Qty: 0 | Refills: 0 | Status: DISCONTINUED | OUTPATIENT
Start: 2018-07-31 | End: 2018-08-03

## 2018-07-31 RX ORDER — INSULIN GLARGINE 100 [IU]/ML
15 INJECTION, SOLUTION SUBCUTANEOUS AT BEDTIME
Qty: 0 | Refills: 0 | Status: DISCONTINUED | OUTPATIENT
Start: 2018-07-31 | End: 2018-08-03

## 2018-07-31 RX ORDER — INSULIN LISPRO 100/ML
VIAL (ML) SUBCUTANEOUS
Qty: 0 | Refills: 0 | Status: DISCONTINUED | OUTPATIENT
Start: 2018-07-31 | End: 2018-08-03

## 2018-07-31 RX ORDER — DEXTROSE 50 % IN WATER 50 %
15 SYRINGE (ML) INTRAVENOUS ONCE
Qty: 0 | Refills: 0 | Status: DISCONTINUED | OUTPATIENT
Start: 2018-07-31 | End: 2018-08-03

## 2018-07-31 RX ORDER — SODIUM CHLORIDE 9 MG/ML
3 INJECTION INTRAMUSCULAR; INTRAVENOUS; SUBCUTANEOUS ONCE
Qty: 0 | Refills: 0 | Status: COMPLETED | OUTPATIENT
Start: 2018-07-31 | End: 2018-07-31

## 2018-07-31 RX ORDER — VALSARTAN 80 MG/1
1 TABLET ORAL
Qty: 0 | Refills: 0 | COMMUNITY

## 2018-07-31 RX ORDER — HEPARIN SODIUM 5000 [USP'U]/ML
5000 INJECTION INTRAVENOUS; SUBCUTANEOUS EVERY 8 HOURS
Qty: 0 | Refills: 0 | Status: DISCONTINUED | OUTPATIENT
Start: 2018-07-31 | End: 2018-08-03

## 2018-07-31 RX ORDER — SITAGLIPTIN 50 MG/1
1 TABLET, FILM COATED ORAL
Qty: 0 | Refills: 0 | COMMUNITY

## 2018-07-31 RX ORDER — SODIUM CHLORIDE 9 MG/ML
1000 INJECTION, SOLUTION INTRAVENOUS
Qty: 0 | Refills: 0 | Status: DISCONTINUED | OUTPATIENT
Start: 2018-07-31 | End: 2018-08-03

## 2018-07-31 RX ORDER — ASPIRIN/CALCIUM CARB/MAGNESIUM 324 MG
325 TABLET ORAL ONCE
Qty: 0 | Refills: 0 | Status: COMPLETED | OUTPATIENT
Start: 2018-07-31 | End: 2018-07-31

## 2018-07-31 RX ORDER — GLUCAGON INJECTION, SOLUTION 0.5 MG/.1ML
1 INJECTION, SOLUTION SUBCUTANEOUS ONCE
Qty: 0 | Refills: 0 | Status: DISCONTINUED | OUTPATIENT
Start: 2018-07-31 | End: 2018-08-03

## 2018-07-31 RX ORDER — DEXTROSE 50 % IN WATER 50 %
12.5 SYRINGE (ML) INTRAVENOUS ONCE
Qty: 0 | Refills: 0 | Status: DISCONTINUED | OUTPATIENT
Start: 2018-07-31 | End: 2018-08-03

## 2018-07-31 RX ORDER — ATORVASTATIN CALCIUM 80 MG/1
1 TABLET, FILM COATED ORAL
Qty: 0 | Refills: 0 | COMMUNITY

## 2018-07-31 RX ORDER — SODIUM CHLORIDE 9 MG/ML
1000 INJECTION INTRAMUSCULAR; INTRAVENOUS; SUBCUTANEOUS ONCE
Qty: 0 | Refills: 0 | Status: COMPLETED | OUTPATIENT
Start: 2018-07-31 | End: 2018-07-31

## 2018-07-31 RX ORDER — LEVETIRACETAM 250 MG/1
500 TABLET, FILM COATED ORAL
Qty: 0 | Refills: 0 | Status: DISCONTINUED | OUTPATIENT
Start: 2018-07-31 | End: 2018-08-03

## 2018-07-31 RX ORDER — ATORVASTATIN CALCIUM 80 MG/1
40 TABLET, FILM COATED ORAL AT BEDTIME
Qty: 0 | Refills: 0 | Status: DISCONTINUED | OUTPATIENT
Start: 2018-07-31 | End: 2018-08-03

## 2018-07-31 RX ADMIN — Medication 325 MILLIGRAM(S): at 10:40

## 2018-07-31 RX ADMIN — HEPARIN SODIUM 5000 UNIT(S): 5000 INJECTION INTRAVENOUS; SUBCUTANEOUS at 21:55

## 2018-07-31 RX ADMIN — SODIUM CHLORIDE 3 MILLILITER(S): 9 INJECTION INTRAMUSCULAR; INTRAVENOUS; SUBCUTANEOUS at 08:58

## 2018-07-31 RX ADMIN — ATORVASTATIN CALCIUM 40 MILLIGRAM(S): 80 TABLET, FILM COATED ORAL at 21:53

## 2018-07-31 RX ADMIN — LEVETIRACETAM 500 MILLIGRAM(S): 250 TABLET, FILM COATED ORAL at 19:36

## 2018-07-31 RX ADMIN — SODIUM CHLORIDE 1000 MILLILITER(S): 9 INJECTION INTRAMUSCULAR; INTRAVENOUS; SUBCUTANEOUS at 09:35

## 2018-07-31 NOTE — ED PROVIDER NOTE - PROGRESS NOTE DETAILS
trop 0.04, pt aware, given aspirin, will admit patient comfortable, labs and studies reviewed, unsteady upon standing therefore do not anticipate discharge, given elevated trop will admit, d/w Dr Gerardo, will admit LR tele

## 2018-07-31 NOTE — ED PROVIDER NOTE - CARE PLAN
Principal Discharge DX:	Dyspnea  Secondary Diagnosis:	Elevated troponin  Secondary Diagnosis:	Leg heaviness

## 2018-07-31 NOTE — PHYSICAL THERAPY INITIAL EVALUATION ADULT - IMPAIRMENTS CONTRIBUTING TO GAIT DEVIATIONS, PT EVAL
decreased strength/narrow base of support/impaired postural control/decreased endurance/impaired balance

## 2018-07-31 NOTE — H&P ADULT - NSHPPHYSICALEXAM_GEN_ALL_CORE
PHYSICAL EXAM:  Vital Signs Last 24 Hrs  T(C): 36 (31 Jul 2018 14:06), Max: 36.7 (31 Jul 2018 08:45)  T(F): 96.8 (31 Jul 2018 14:06), Max: 98.1 (31 Jul 2018 08:45)  HR: 70 (31 Jul 2018 14:06) (70 - 85)  BP: 128/61 (31 Jul 2018 14:06) (123/59 - 129/63)  RR: 16 (31 Jul 2018 14:06) (16 - 20)  SpO2: 99% (31 Jul 2018 12:24) (94% - 99%)    GENERAL: NAD, well-groomed, well-developed  HEAD:  Atraumatic, Normocephalic  EYES: EOMI, PERRLA, conjunctiva and sclera clear  ENMT: No tonsillar erythema, exudates, or enlargement; Moist mucous membranes, Good dentition, No lesions  NECK: Supple, No JVD, Normal thyroid  NERVOUS SYSTEM:  Alert & Oriented X3, Motor Strength 5/5 B/L upper and lower extremities  CHEST/LUNG: Clear to percussion bilaterally; No rales, rhonchi, wheezing, or rubs  HEART: Regular rate and rhythm; No murmurs, rubs, or gallops  ABDOMEN: Soft, Nontender, Nondistended; Bowel sounds present  EXTREMITIES:  2+ Peripheral Pulses, No clubbing, cyanosis, or edema  LYMPH: No lymphadenopathy noted  SKIN: No rashes or lesions

## 2018-07-31 NOTE — PHYSICAL THERAPY INITIAL EVALUATION ADULT - GENERAL OBSERVATIONS, REHAB EVAL
15:20 - 15:40.  Chart reviewed. Patient available to be seen for physical therapy, confirmed with nurse. Patient encountered semi-reclined in bed. Patient denies pain at rest, would like to walk with PT now

## 2018-07-31 NOTE — ED ADULT NURSE NOTE - NSIMPLEMENTINTERV_GEN_ALL_ED
Implemented All Fall with Harm Risk Interventions:  [place] to call system. Call bell, personal items and telephone within reach. Instruct patient to call for assistance. Room bathroom lighting operational. Non-slip footwear when patient is off stretcher. Physically safe environment: no spills, clutter or unnecessary equipment. Stretcher in lowest position, wheels locked, appropriate side rails in place. Provide visual cue, wrist band, yellow gown, etc. Monitor gait and stability. Monitor for mental status changes and reorient to person, place, and time. Review medications for side effects contributing to fall risk. Reinforce activity limits and safety measures with patient and family. Provide visual clues: red socks.

## 2018-07-31 NOTE — H&P ADULT - NSHPLABSRESULTS_GEN_ALL_CORE
LABS  07-31    140  |  100  |  13  ----------------------------<  213<H>  4.2   |  22  |  1.0    Ca    9.6      31 Jul 2018 09:28    TPro  6.6  /  Alb  4.5  /  TBili  0.4  /  DBili  x   /  AST  22  /  ALT  41  /  AlkPhos  107  07-31                          15.0   8.70  )-----------( 231      ( 31 Jul 2018 09:28 )             43.5       < from: 12 Lead ECG (07.31.18 @ 09:13) >    Diagnosis Line Normal sinus rhythm  Normal ECG    CARDIAC ENZYMES  Troponin T, Serum (07.31.18 @ 09:28)    Troponin T, Serum: 0.04:     < from: Xray Chest 2 Views PA/Lat (07.31.18 @ 09:14) >    Impression:      No radiographic evidence of acute cardiopulmonary disease.

## 2018-07-31 NOTE — H&P ADULT - HISTORY OF PRESENT ILLNESS
68 year old female reports she fell in her kitchen yesterday (she doesn't recall any details)  and today was very weak and hunched over while walking out to [facility]. Her daughter who observed her from the window reports she was diaphoretic and weak appearing. Pt denies SOB, chest pain, fevers or chills.

## 2018-07-31 NOTE — ED ADULT NURSE NOTE - CHIEF COMPLAINT QUOTE
"I am very shaky in my legs since this morning and they are heavy. I felt SOB. I think my sugar was high" FS in ER is 204. Pt denies SOB at this time

## 2018-07-31 NOTE — ED PROVIDER NOTE - MEDICAL DECISION MAKING DETAILS
68y female above PMH with heaviness in legs since yesterday with episode of pallor and diaphoresis with SOB while trying to walk from house to access a ride (usually ambulates independently but for short distances), family suspected hypoglycemia and called 911 but pt glucose 200s without supplement pta, pt on stable dose of antihypertensives, was recently started on januvia, denies headache, chest pain, fever, cough, or leg swelling, no n/v/d, no melena, no change in baseline neuro (has short term memory issues and no peripheral vision), on exam .vas, AAO x 3 and well appearing, head nc/at with palpable shunt reservoir with normal filling, perrla, eomi (diffuclty tracking alterally from impaired vision), dry mm, neck supple, cor rrr no m/r/g, lungs cta, abd +bs, snt/nd, calves nontender no c/c/e neuro nonfocal, labs and studies reviewed, will admit for cardiac, rehab eval (see progress notes)

## 2018-07-31 NOTE — H&P ADULT - NSHPREVIEWOFSYSTEMS_GEN_ALL_CORE
CONSTITUTIONAL: yes weakness, no fevers or chills  EYES/ENT: No visual changes;  No vertigo or throat pain   NECK: No pain or stiffness  RESPIRATORY: No cough, wheezing, hemoptysis; No shortness of breath  CARDIOVASCULAR: No chest pain or palpitations  GASTROINTESTINAL: No abdominal or epigastric pain. No nausea, vomiting, or hematemesis; No diarrhea or constipation. No melena or hematochezia.  GENITOURINARY: No dysuria, frequency or hematuria  NEUROLOGICAL: No numbness or weakness  SKIN: No itching, rashes

## 2018-07-31 NOTE — ED PROVIDER NOTE - OBJECTIVE STATEMENT
Pt is a 69y/o female with a pmhx of lung cancer in remission, Colloid cyst s/o  shunt in 2014, HTN, HLD, DM presents today c/o bilateral leg heaviness x 2 days, with associated GORDON and diaphoresis that occurred today which prompted her visit. Pt denies CP, Abd pain, n/v/d, fever, chills, numbness, head trauma, LOC, HA, weakness, numbness.

## 2018-07-31 NOTE — ED PROVIDER NOTE - NS ED ROS FT
Eyes:  No visual changes, eye pain or discharge.  ENMT:  No hearing changes, pain, discharge or infections. No neck pain or stiffness.  Cardiac:  GORDON, No chest pain, edema. No chest pain with exertion.  Respiratory:  No cough or respiratory distress. No hemoptysis. No history of asthma or RAD.  GI:  No nausea, vomiting, diarrhea or abdominal pain.  MS:  No myalgia, muscle weakness, joint pain or back pain.  Neuro:  No headache or weakness.  No LOC.  Skin:  No skin rash.   Endocrine: + diabetes.  Except as documented in the HPI,  all other systems are negative.

## 2018-07-31 NOTE — PHYSICAL THERAPY INITIAL EVALUATION ADULT - IMPAIRMENTS FOUND, PT EVAL
ergonomics and body mechanics/posture/muscle strength/gait, locomotion, and balance/aerobic capacity/endurance

## 2018-07-31 NOTE — ED PROVIDER NOTE - PHYSICAL EXAMINATION
VITAL SIGNS: I have reviewed nursing notes and confirm.  CONSTITUTIONAL: Well-developed; well-nourished; in no acute distress.   SKIN: skin exam is warm and dry, no acute rash.    HEAD: Normocephalic; atraumatic.  EYES: PERRL, EOM intact; conjunctiva and sclera clear.  ENT: No nasal discharge; airway clear.  CARD: S1, S2 normal; no murmurs, gallops, or rubs. Regular rate and rhythm.   RESP: No wheezes, rales or rhonchi.  ABD: Normal bowel sounds; soft; non-distended; non-tender  EXT: Normal ROM.  No clubbing, cyanosis or edema.   NEURO: muscle strength 5/5 throughout, both flexor/extensor mechanism, sensation intact, Alert, oriented, grossly unremarkable

## 2018-07-31 NOTE — H&P ADULT - FAMILY HISTORY
Father  Still living? No  Family history of brain cancer, Age at diagnosis: Age Unknown     Mother  Still living? No  Family history of lung cancer, Age at diagnosis: Age Unknown

## 2018-08-01 DIAGNOSIS — T79.6XXA TRAUMATIC ISCHEMIA OF MUSCLE, INITIAL ENCOUNTER: ICD-10-CM

## 2018-08-01 LAB
ESTIMATED AVERAGE GLUCOSE: 186 MG/DL — HIGH (ref 68–114)
HBA1C BLD-MCNC: 8.1 % — HIGH (ref 4–5.6)

## 2018-08-01 RX ORDER — ACETAMINOPHEN 500 MG
650 TABLET ORAL EVERY 6 HOURS
Qty: 0 | Refills: 0 | Status: DISCONTINUED | OUTPATIENT
Start: 2018-08-01 | End: 2018-08-03

## 2018-08-01 RX ADMIN — LEVETIRACETAM 500 MILLIGRAM(S): 250 TABLET, FILM COATED ORAL at 17:46

## 2018-08-01 RX ADMIN — Medication 650 MILLIGRAM(S): at 10:38

## 2018-08-01 RX ADMIN — Medication 650 MILLIGRAM(S): at 04:24

## 2018-08-01 RX ADMIN — LEVETIRACETAM 500 MILLIGRAM(S): 250 TABLET, FILM COATED ORAL at 06:25

## 2018-08-01 RX ADMIN — ATORVASTATIN CALCIUM 40 MILLIGRAM(S): 80 TABLET, FILM COATED ORAL at 21:32

## 2018-08-01 RX ADMIN — Medication 5 UNIT(S): at 12:22

## 2018-08-01 RX ADMIN — Medication 650 MILLIGRAM(S): at 23:19

## 2018-08-01 RX ADMIN — Medication 650 MILLIGRAM(S): at 21:32

## 2018-08-01 RX ADMIN — Medication 650 MILLIGRAM(S): at 11:29

## 2018-08-01 RX ADMIN — LOSARTAN POTASSIUM 100 MILLIGRAM(S): 100 TABLET, FILM COATED ORAL at 06:27

## 2018-08-01 RX ADMIN — HEPARIN SODIUM 5000 UNIT(S): 5000 INJECTION INTRAVENOUS; SUBCUTANEOUS at 06:28

## 2018-08-01 RX ADMIN — INSULIN GLARGINE 15 UNIT(S): 100 INJECTION, SOLUTION SUBCUTANEOUS at 21:32

## 2018-08-01 RX ADMIN — HEPARIN SODIUM 5000 UNIT(S): 5000 INJECTION INTRAVENOUS; SUBCUTANEOUS at 13:12

## 2018-08-01 RX ADMIN — AMLODIPINE BESYLATE 10 MILLIGRAM(S): 2.5 TABLET ORAL at 06:28

## 2018-08-01 RX ADMIN — Medication 2: at 12:21

## 2018-08-01 NOTE — CONSULT NOTE ADULT - ASSESSMENT
IMPRESSION: Rehab of 69 y/o  f  reahb  for  ataxia  gd  lbp      PRECAUTIONS: [  ] Cardiac  [  ] Respiratory  [  ] Seizures [  ] Contact Isolation  [  ] Droplet Isolation  [  x] Other   fall  Weight Bearing Status:     RECOMMENDATION:    Out of Bed to Chair     DVT/Decubiti Prophylaxis    REHAB PLAN:     [ x] Bedside P/T 3-5 times a week   [   ]   Bedside O/T  2-3 times a week             [   ] No Rehab Therapy Indicated                   [   ]  Speech Therapy   Conditioning/ROM                                    ADL  Bed Mobility                                               Conditioning/ROM  Transfers                                                     Bed Mobility  Sitting /Standing Balance                         Transfers                                        Gait Training                                               Sitting/Standing Balance  Stair Training [   ]Applicable                    Home equipment Eval                                                                        Splinting  [   ] Only      GOALS:   ADL   [ x ]   Independent                    Transfers  [  x ] Independent                          Ambulation  [x   ] Independent     [x   ] With device                            [   ]  CG                                                         [   ]  CG                                                                  [   ] CG                            [    ] Min A                                                   [   ] Min A                                                              [   ] Min  A          DISCHARGE PLAN:   [   ]  Good candidate for Intensive Rehabilitation/Hospital based-4A [facility]                                             Will tolerate 3hrs Intensive Rehab Daily                                       [  xx  ]  Short Term Rehab in Skilled Nursing Facility d/w and  agreer  ptn will  speck  to  family                                       [    ]  Home with Outpatient or VN services                                         [    ]  Possible Candidate for Intensive Hospital based Rehab

## 2018-08-01 NOTE — CONSULT NOTE ADULT - SUBJECTIVE AND OBJECTIVE BOX
HPI:  68 year old female reports she fell in her kitchen yesterday (she doesn't recall any details)  and today was very weak and hunched over while walking out to take a cab to her senior center. Her daughter who observed her from the window reports she was diaphoretic and weak appearing. Pt denies SOB, chest pain, fevers or chills.   ptn  c/o  back  pain     PTN  REFERRED TO ACUTE  REHAB  FOR  EVAL AND  TX   PAST MEDICAL & SURGICAL HISTORY:  Lung cancer  Seizures  Diabetes mellitus  Hypertension  Seizures  History of craniotomy  Status post partial lobectomy of lung  S/P ventriculoperitoneal shunt      Hospital Course:    TODAY'S SUBJECTIVE & REVIEW OF SYMPTOMS:     Constitutional WNL   Cardio WNL   Resp WNL   GI WNL  Heme WNL  Endo WNL  Skin WNL  MSK WNL  Neuro WNL  Cognitive WNL  Psych WNL      MEDICATIONS  (STANDING):  amLODIPine   Tablet 10 milliGRAM(s) Oral daily  atorvastatin 40 milliGRAM(s) Oral at bedtime  dextrose 5%. 1000 milliLiter(s) (50 mL/Hr) IV Continuous <Continuous>  dextrose 50% Injectable 12.5 Gram(s) IV Push once  dextrose 50% Injectable 25 Gram(s) IV Push once  dextrose 50% Injectable 25 Gram(s) IV Push once  heparin  Injectable 5000 Unit(s) SubCutaneous every 8 hours  insulin glargine Injectable (LANTUS) 15 Unit(s) SubCutaneous at bedtime  insulin lispro (HumaLOG) corrective regimen sliding scale   SubCutaneous three times a day before meals  insulin lispro Injectable (HumaLOG) 5 Unit(s) SubCutaneous before breakfast  insulin lispro Injectable (HumaLOG) 5 Unit(s) SubCutaneous before lunch  insulin lispro Injectable (HumaLOG) 5 Unit(s) SubCutaneous before dinner  levETIRAcetam 500 milliGRAM(s) Oral two times a day  losartan 100 milliGRAM(s) Oral daily    MEDICATIONS  (PRN):  acetaminophen   Tablet. 650 milliGRAM(s) Oral every 6 hours PRN Mild Pain (1 - 3)  dextrose 40% Gel 15 Gram(s) Oral once PRN Blood Glucose LESS THAN 70 milliGRAM(s)/deciliter  glucagon  Injectable 1 milliGRAM(s) IntraMuscular once PRN Glucose LESS THAN 70 milligrams/deciliter      FAMILY HISTORY:  Family history of lung cancer (Mother): Mother: .  Family history of brain cancer (Father): Father; .      Allergies    codeine (Unknown)    Intolerances        SOCIAL HISTORY:    [  ] Etoh  [  ] Smoking  [  ] Substance abuse     Home Environment:  [ x ] Home Alone  [  ] Lives with Family  [  ] Home Health Aid    Dwelling:  [  ] Apartment  [ x ] Private House  [  ] Adult Home  [  ] Skilled Nursing Facility      [  ] Short Term  [  ] Long Term  [  x] Stairs       Elevator [  ]    FUNCTIONAL STATUS PTA: (Check all that apply)  Ambulation: [   x]Independent    [  ] Dependent     [  ] Non-Ambulatory  Assistive Device: [x  ] SA Cane  [  ]  Q Cane  [x  ] Walker  [  ]  Wheelchair  ADL : [ x ] Independent  [  ]  Dependent       Vital Signs Last 24 Hrs  T(C): 35.9 (01 Aug 2018 05:05), Max: 36.4 (2018 22:00)  T(F): 96.6 (01 Aug 2018 05:05), Max: 97.6 (2018 22:00)  HR: 52 (01 Aug 2018 05:05) (52 - 74)  BP: 118/58 (01 Aug 2018 05:05) (118/58 - 149/65)  BP(mean): --  RR: 16 (01 Aug 2018 05:05) (16 - 18)  SpO2: 95% (2018 15:40) (95% - 99%)      PHYSICAL EXAM: Alert & Oriented X3  GENERAL: NAD, well-groomed, well-developed  HEAD:  Atraumatic, Normocephalic  EYES: EOMI, PERRLA, conjunctiva and sclera clear  NECK: Supple, No JVD, Normal thyroid  CHEST/LUNG: Clear to percussion bilaterally; No rales, rhonchi, wheezing, or rubs  HEART: Regular rate and rhythm; No murmurs, rubs, or gallops  ABDOMEN: Soft, Nontender, Nondistended; Bowel sounds present  EXTREMITIES:  2+ Peripheral Pulses, No clubbing, cyanosis, or edema    NERVOUS SYSTEM:  Cranial Nerves 2-12 intact [x  ] Abnormal  [  ]  ROM: WFL all extremities [ x ]  Abnormal [  ]  Motor Strength: WFL all extremities  [  ]  Abnormal [ 4/5  le   ]  Sensation: intact to light touch [  ] Abnormal [x  ]  Reflexes: Symmetric [ x ]  Abnormal [  ]    FUNCTIONAL STATUS:  Bed Mobility: Independent [  ]  Supervision [  ]  Needs Assistance [ x ]  N/A [  ]  Transfers: Independent [  ]  Supervision [  ]  Needs Assistance [ x ]  N/A [  ]   Ambulation: Independent [  ]  Supervision [  ]  Needs Assistance [x  ]  N/A [  ]  ADL: Independent [ x ] Requires Assistance [  ] N/A [  ]  ptn  need min  asst  to  tf and  ambul  wit  cane need  min  ass  not  save  d/c  home     LABS:                        15.0   8.70  )-----------( 231      ( 2018 09:28 )             43.5         140  |  100  |  13  ----------------------------<  213<H>  4.2   |  22  |  1.0    Ca    9.6      2018 09:28    TPro  6.6  /  Alb  4.5  /  TBili  0.4  /  DBili  x   /  AST  22  /  ALT  41  /  AlkPhos  107            RADIOLOGY & ADDITIONAL STUDIES:    Assesment:

## 2018-08-02 LAB
ALBUMIN SERPL ELPH-MCNC: 4.1 G/DL — SIGNIFICANT CHANGE UP (ref 3.5–5.2)
ALP SERPL-CCNC: 95 U/L — SIGNIFICANT CHANGE UP (ref 30–115)
ALT FLD-CCNC: 34 U/L — SIGNIFICANT CHANGE UP (ref 0–41)
ANION GAP SERPL CALC-SCNC: 13 MMOL/L — SIGNIFICANT CHANGE UP (ref 7–14)
AST SERPL-CCNC: 20 U/L — SIGNIFICANT CHANGE UP (ref 0–41)
BILIRUB SERPL-MCNC: 0.4 MG/DL — SIGNIFICANT CHANGE UP (ref 0.2–1.2)
BUN SERPL-MCNC: 10 MG/DL — SIGNIFICANT CHANGE UP (ref 10–20)
CALCIUM SERPL-MCNC: 8.8 MG/DL — SIGNIFICANT CHANGE UP (ref 8.5–10.1)
CHLORIDE SERPL-SCNC: 103 MMOL/L — SIGNIFICANT CHANGE UP (ref 98–110)
CK MB CFR SERPL CALC: 3.3 NG/ML — SIGNIFICANT CHANGE UP (ref 0.6–6.3)
CK SERPL-CCNC: 207 U/L — SIGNIFICANT CHANGE UP (ref 0–225)
CO2 SERPL-SCNC: 24 MMOL/L — SIGNIFICANT CHANGE UP (ref 17–32)
CREAT SERPL-MCNC: 0.6 MG/DL — LOW (ref 0.7–1.5)
GLUCOSE SERPL-MCNC: 156 MG/DL — HIGH (ref 70–99)
HCT VFR BLD CALC: 40.5 % — SIGNIFICANT CHANGE UP (ref 37–47)
HGB BLD-MCNC: 13.7 G/DL — SIGNIFICANT CHANGE UP (ref 12–16)
MCHC RBC-ENTMCNC: 29.2 PG — SIGNIFICANT CHANGE UP (ref 27–31)
MCHC RBC-ENTMCNC: 33.8 G/DL — SIGNIFICANT CHANGE UP (ref 32–37)
MCV RBC AUTO: 86.4 FL — SIGNIFICANT CHANGE UP (ref 81–99)
NRBC # BLD: 0 /100 WBCS — SIGNIFICANT CHANGE UP (ref 0–0)
PLATELET # BLD AUTO: 205 K/UL — SIGNIFICANT CHANGE UP (ref 130–400)
POTASSIUM SERPL-MCNC: 4.3 MMOL/L — SIGNIFICANT CHANGE UP (ref 3.5–5)
POTASSIUM SERPL-SCNC: 4.3 MMOL/L — SIGNIFICANT CHANGE UP (ref 3.5–5)
PROT SERPL-MCNC: 5.8 G/DL — LOW (ref 6–8)
RBC # BLD: 4.69 M/UL — SIGNIFICANT CHANGE UP (ref 4.2–5.4)
RBC # FLD: 12.8 % — SIGNIFICANT CHANGE UP (ref 11.5–14.5)
SODIUM SERPL-SCNC: 140 MMOL/L — SIGNIFICANT CHANGE UP (ref 135–146)
TROPONIN T SERPL-MCNC: 0.01 NG/ML — SIGNIFICANT CHANGE UP
WBC # BLD: 4.6 K/UL — LOW (ref 4.8–10.8)
WBC # FLD AUTO: 4.6 K/UL — LOW (ref 4.8–10.8)

## 2018-08-02 RX ORDER — NICOTINE POLACRILEX 2 MG
1 GUM BUCCAL DAILY
Qty: 0 | Refills: 0 | Status: DISCONTINUED | OUTPATIENT
Start: 2018-08-02 | End: 2018-08-03

## 2018-08-02 RX ADMIN — Medication 5 UNIT(S): at 12:29

## 2018-08-02 RX ADMIN — ATORVASTATIN CALCIUM 40 MILLIGRAM(S): 80 TABLET, FILM COATED ORAL at 21:48

## 2018-08-02 RX ADMIN — HEPARIN SODIUM 5000 UNIT(S): 5000 INJECTION INTRAVENOUS; SUBCUTANEOUS at 15:22

## 2018-08-02 RX ADMIN — LEVETIRACETAM 500 MILLIGRAM(S): 250 TABLET, FILM COATED ORAL at 18:17

## 2018-08-02 RX ADMIN — AMLODIPINE BESYLATE 10 MILLIGRAM(S): 2.5 TABLET ORAL at 05:45

## 2018-08-02 RX ADMIN — LEVETIRACETAM 500 MILLIGRAM(S): 250 TABLET, FILM COATED ORAL at 05:45

## 2018-08-02 RX ADMIN — Medication 650 MILLIGRAM(S): at 22:38

## 2018-08-02 RX ADMIN — HEPARIN SODIUM 5000 UNIT(S): 5000 INJECTION INTRAVENOUS; SUBCUTANEOUS at 05:46

## 2018-08-02 RX ADMIN — LOSARTAN POTASSIUM 100 MILLIGRAM(S): 100 TABLET, FILM COATED ORAL at 05:45

## 2018-08-02 RX ADMIN — INSULIN GLARGINE 15 UNIT(S): 100 INJECTION, SOLUTION SUBCUTANEOUS at 21:48

## 2018-08-02 RX ADMIN — Medication 2: at 12:29

## 2018-08-02 RX ADMIN — HEPARIN SODIUM 5000 UNIT(S): 5000 INJECTION INTRAVENOUS; SUBCUTANEOUS at 21:48

## 2018-08-02 NOTE — PROGRESS NOTE ADULT - PROBLEM SELECTOR PLAN 5
DC smoking  Does not wish for help to quit smoking at this time
DC smoking  Does not wish for help to quit smoking at this time

## 2018-08-02 NOTE — PROGRESS NOTE ADULT - SUBJECTIVE AND OBJECTIVE BOX
Pt seen and examined. Pt feels well.    T(F): , Max: 97.6 (07-31-18 @ 22:00)  HR: 52 (08-01-18 @ 05:05) (52 - 74)  BP: 118/58 (08-01-18 @ 05:05)  RR: 16 (08-01-18 @ 05:05)  SpO2: 95% (07-31-18 @ 15:40)  IN: 0 mL / OUT: 150 mL / NET: -150 mL    84.8  General: No apparent distress  Cardiovascular: S1, S2  Gastrointestinal: Soft, Non-tender, Non-distended  Respiratory: Good air entry bilaterally  Musculoskeletal: Moves all extremities  Lymphatic: No edema  Neurologic: No gross motor deficit  Dermatologic: Skin dry                          15.0   8.70  )-----------( 231      ( 31 Jul 2018 09:28 )             43.5     07-31    140  |  100  |  13  ----------------------------<  213<H>  4.2   |  22  |  1.0    Ca    9.6      31 Jul 2018 09:28    TPro  6.6  /  Alb  4.5  /  TBili  0.4  /  DBili  x   /  AST  22  /  ALT  41  /  AlkPhos  107  07-31
Pt seen and examined. Pt feels well. Spoke to son on phone    T(F): , Max: 96.8 (08-01-18 @ 14:04)  HR: 59 (08-02-18 @ 05:26) (59 - 63)  BP: 143/64 (08-02-18 @ 05:26)  RR: 16 (08-02-18 @ 05:26)  SpO2: --  General: No apparent distress  Cardiovascular: S1, S2  Gastrointestinal: Soft, Non-tender, Non-distended  Respiratory: Good air entry bilaterally  Musculoskeletal: Moves all extremities  Lymphatic: No edema  Neurologic: No gross motor deficit  Dermatologic: Skin dry                          13.7   4.60  )-----------( 205      ( 02 Aug 2018 06:31 )             40.5     08-02    140  |  103  |  10  ----------------------------<  156<H>  4.3   |  24  |  0.6<L>    Ca    8.8      02 Aug 2018 06:31    TPro  5.8<L>  /  Alb  4.1  /  TBili  0.4  /  DBili  x   /  AST  20  /  ALT  34  /  AlkPhos  95  08-02

## 2018-08-03 VITALS
TEMPERATURE: 96 F | SYSTOLIC BLOOD PRESSURE: 132 MMHG | DIASTOLIC BLOOD PRESSURE: 62 MMHG | HEART RATE: 50 BPM | RESPIRATION RATE: 16 BRPM

## 2018-08-03 RX ORDER — NICOTINE POLACRILEX 2 MG
1 GUM BUCCAL
Qty: 0 | Refills: 0 | COMMUNITY
Start: 2018-08-03

## 2018-08-03 RX ORDER — AMLODIPINE BESYLATE 2.5 MG/1
1 TABLET ORAL
Qty: 0 | Refills: 0 | COMMUNITY
Start: 2018-08-03

## 2018-08-03 RX ORDER — LEVETIRACETAM 250 MG/1
1 TABLET, FILM COATED ORAL
Qty: 0 | Refills: 0 | COMMUNITY

## 2018-08-03 RX ORDER — LOSARTAN POTASSIUM 100 MG/1
1 TABLET, FILM COATED ORAL
Qty: 0 | Refills: 0 | COMMUNITY

## 2018-08-03 RX ORDER — LEVETIRACETAM 250 MG/1
1 TABLET, FILM COATED ORAL
Qty: 0 | Refills: 0 | COMMUNITY
Start: 2018-08-03

## 2018-08-03 RX ORDER — LOSARTAN POTASSIUM 100 MG/1
1 TABLET, FILM COATED ORAL
Qty: 0 | Refills: 0 | COMMUNITY
Start: 2018-08-03

## 2018-08-03 RX ADMIN — LOSARTAN POTASSIUM 100 MILLIGRAM(S): 100 TABLET, FILM COATED ORAL at 05:43

## 2018-08-03 RX ADMIN — Medication 1 PATCH: at 10:22

## 2018-08-03 RX ADMIN — LEVETIRACETAM 500 MILLIGRAM(S): 250 TABLET, FILM COATED ORAL at 05:44

## 2018-08-03 RX ADMIN — HEPARIN SODIUM 5000 UNIT(S): 5000 INJECTION INTRAVENOUS; SUBCUTANEOUS at 05:44

## 2018-08-03 RX ADMIN — AMLODIPINE BESYLATE 10 MILLIGRAM(S): 2.5 TABLET ORAL at 05:43

## 2018-08-03 NOTE — DISCHARGE NOTE ADULT - MEDICATION SUMMARY - MEDICATIONS TO STOP TAKING
I will STOP taking the medications listed below when I get home from the hospital:    magnesium oxide 400 mg (241.3 mg elemental magnesium) oral tablet  -- 1 tab(s) by mouth 3 times a day (with meals)

## 2018-08-03 NOTE — DISCHARGE NOTE ADULT - HOSPITAL COURSE
Pt admitted s/p fall. Pt underwent telemetry, echo and enzyme testing which showed elevated CPK consistent with fall. Pt discharged to rehab with instructions to f/u PMD within 2 weeks

## 2018-08-03 NOTE — DISCHARGE NOTE ADULT - MEDICATION SUMMARY - MEDICATIONS TO TAKE
I will START or STAY ON the medications listed below when I get home from the hospital:    acetaminophen 325 mg oral tablet  -- 2 tab(s) by mouth every 6 hours, As needed, For Temp greater than 38.5 C (101.3 F)  -- Indication: For Pain    losartan 100 mg oral tablet  -- 1 tab(s) by mouth once a day  -- Indication: For Hypertension    levETIRAcetam 500 mg oral tablet  -- 1 tab(s) by mouth 2 times a day  -- Indication: For Seizures    metFORMIN 1000 mg oral tablet  -- 1 tab(s) by mouth 2 times a day  -- Indication: For Diabetes mellitus    Januvia 50 mg oral tablet  -- 1 tab(s) by mouth once a day  -- Indication: For Diabetes mellitus    glimepiride 4 mg oral tablet  -- 1 tab(s) by mouth once a day  -- Indication: For Diabetes mellitus    atorvastatin 40 mg oral tablet  -- 1 tab(s) by mouth once a day  -- Indication: For Hypertension    amLODIPine 10 mg oral tablet  -- 1 tab(s) by mouth once a day  -- Indication: For Hypertension    nicotine 14 mg/24 hr transdermal film, extended release  -- 1  by transdermal patch once a day  -- Indication: For Cigarette smoker

## 2018-08-03 NOTE — DISCHARGE NOTE ADULT - PATIENT PORTAL LINK FT
You can access the [facility] Patient Portal, offered by [facility], by registering with the following website: [email or web address]

## 2018-08-03 NOTE — DISCHARGE NOTE ADULT - CARE PLAN
Principal Discharge DX:	Fall, initial encounter  Goal:	To prevent further falls  Assessment and plan of treatment:	Take all medication as prescribed and f/u with PMD at rehab facility

## 2018-08-04 ENCOUNTER — OUTPATIENT (OUTPATIENT)
Dept: OUTPATIENT SERVICES | Facility: HOSPITAL | Age: 68
LOS: 1 days | Discharge: HOME | End: 2018-08-04

## 2018-08-04 DIAGNOSIS — Z98.2 PRESENCE OF CEREBROSPINAL FLUID DRAINAGE DEVICE: Chronic | ICD-10-CM

## 2018-08-04 DIAGNOSIS — R79.9 ABNORMAL FINDING OF BLOOD CHEMISTRY, UNSPECIFIED: ICD-10-CM

## 2018-08-04 DIAGNOSIS — Z98.890 OTHER SPECIFIED POSTPROCEDURAL STATES: Chronic | ICD-10-CM

## 2018-08-04 DIAGNOSIS — D64.9 ANEMIA, UNSPECIFIED: ICD-10-CM

## 2018-08-04 DIAGNOSIS — Z90.2 ACQUIRED ABSENCE OF LUNG [PART OF]: Chronic | ICD-10-CM

## 2018-08-04 PROBLEM — C34.90 MALIGNANT NEOPLASM OF UNSPECIFIED PART OF UNSPECIFIED BRONCHUS OR LUNG: Chronic | Status: ACTIVE | Noted: 2018-07-31

## 2018-08-08 DIAGNOSIS — Z88.8 ALLERGY STATUS TO OTHER DRUGS, MEDICAMENTS AND BIOLOGICAL SUBSTANCES: ICD-10-CM

## 2018-08-08 DIAGNOSIS — R74.8 ABNORMAL LEVELS OF OTHER SERUM ENZYMES: ICD-10-CM

## 2018-08-08 DIAGNOSIS — F17.210 NICOTINE DEPENDENCE, CIGARETTES, UNCOMPLICATED: ICD-10-CM

## 2018-08-08 DIAGNOSIS — Z85.118 PERSONAL HISTORY OF OTHER MALIGNANT NEOPLASM OF BRONCHUS AND LUNG: ICD-10-CM

## 2018-08-08 DIAGNOSIS — R53.1 WEAKNESS: ICD-10-CM

## 2018-08-08 DIAGNOSIS — Z79.84 LONG TERM (CURRENT) USE OF ORAL HYPOGLYCEMIC DRUGS: ICD-10-CM
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
## 2018-08-08 DIAGNOSIS — G40.909 EPILEPSY, UNSPECIFIED, NOT INTRACTABLE, WITHOUT STATUS EPILEPTICUS: ICD-10-CM

## 2018-08-08 DIAGNOSIS — T79.6XXA TRAUMATIC ISCHEMIA OF MUSCLE, INITIAL ENCOUNTER: ICD-10-CM
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
## 2018-08-08 DIAGNOSIS — I10 ESSENTIAL (PRIMARY) HYPERTENSION: ICD-10-CM

## 2018-08-08 DIAGNOSIS — E78.5 HYPERLIPIDEMIA, UNSPECIFIED: ICD-10-CM

## 2018-08-08 DIAGNOSIS — E11.9 TYPE 2 DIABETES MELLITUS WITHOUT COMPLICATIONS: ICD-10-CM

## 2019-08-29 NOTE — H&P ADULT - PROBLEM SELECTOR PLAN 1
pt / rehab consult  check repeat CE and EKG  check 2DECHO pt / rehab consult  check repeat CE and EKG  check 2DECHO  check D-Dimer Simponi Counseling:  I discussed with the patient the risks of golimumab including but not limited to myelosuppression, immunosuppression, autoimmune hepatitis, demyelinating diseases, lymphoma, and serious infections.  The patient understands that monitoring is required including a PPD at baseline and must alert us or the primary physician if symptoms of infection or other concerning signs are noted.

## 2019-10-15 PROBLEM — Z00.00 ENCOUNTER FOR PREVENTIVE HEALTH EXAMINATION: Status: ACTIVE | Noted: 2019-10-15

## 2019-11-13 NOTE — PATIENT PROFILE ADULT. - ACCEPTABLE
Post Acute Skilled Nursing Home Subsequent Visit Note     Date of Service: 11/13/2019  Location seen at: [facility]  Subacute / Skilled Need: Rehabilitation    PCP: Tiera Burnett MD   Patient Care Team:  Tiera Burnett MD as PCP - General (Family Practice)  Sheba Flores CNP as Post Acute Physicians at Home: APC (Nurse Practitioner - Family)  Gloria Fernando, RN as Post Acute Physicians at Home: RN (/Care Coordinator)  BLADE GaitanW as Post Acute Physicians at Home: LCSW (Social Work)  Alida Paulino CNP as Post Acute Facility Provider: APC (Nurse Practitioner - Family)  Evan Hsu MD as Post Acute Facility Provider: Physician (Geriatric Medicine)  Seen by Alida Paulino CNP today    Kelby Oliveira is a 72 year old male presenting to [facility] for: rehab.   History of Present Illness:Pt is a 72 yr old male with a PMH of anxiety, arthritis, BPH, CKD stage 3, CHF, COPD, IDDM, HTN, HL and CAD w/ cardiac stents who presented to [facility] with c/o SOB and weight gain. Pt sts upon transfer from EMS stretcher to ED cart sustained a right quadraceps tendon tear- pt was admitted to hosp and treated for acute on chronic combined HF-treated w/ IV lasix, pt also dev hemoptysis while in hosp- CT chest  pulmonologist was consulted. CT chest showed possible right lower lobe pneumonia- pt was treated w/ IV abt and switched to PO prior to d/c.pt seen by ortho and rec short leg splint  Pt sent here for rehab    Pt seen today in bed- awake/alert- on 02 here- pt c/o pain to right thigh - no current f/c/cp/occas cough, no nvd, no BM x 4 days- wants laxative later, appeitite is ok, sleep ok-    10/23/19  Pt seen today in bed- awake/alert-c/o pain to BLE, + chills , no fever, + cough and SOB w/ exertion- no CP. Back pain, no nv- no BM x 5 days- d/w RN- pt wants \"the Bomb\".  Pt sts is urinating as usual.     10/24/19  Pt seen today in bed- awake/alert- sts is  feeling a little better today- ate x 2 meals yest and had a large BM- on and off SOB - sts when 02 off- on 02 now- has not been out of bed- sts his neighbor brought his leg brace last night- pt encouraged to get out of bed    10/30/19  Pt seen today up in w/c - awake/alert- no current pain to RLE- sts pain with movement- has appt with Dr. Saha next week- pt denies any f/c/cp/sob, cough, nvd, no bm x days, sts weight is down, appetite is fair, sleep ok  In therapy, pt is  amb unable  Transfers sliding board mod/max a  Bed mob: mod a    11/3/19  Pt seen today in bed-  - awake/alert- per nursing report- pt has been coughing more and doesn't look good-  pt seen sts has been on and off chills and sweating no recorded fevers,  +cough- is productive, no nvd, no bm x 3days, sts weight is up 3 lbs- no abd blotaing,urinating well,  appetite is ok, sleep ok  Discussed poc with pt and nurse    11/4/19  Pt seen today in bed-  - awake/alert- pt c/o pain to RLE , back and shoulders, informed CXR was neg for PNA and HF  no nvd, no bm x 3days- pt sts will make plan with RN ,urinating well,  appetite is ok, sleep ok  In therapy, pt is  Amb: unable w/c propels 20-60 ft mod I  Transfers total max  Bed mob: mod a  Pt has appt with Dr. Saha on 11/6 11/6/19  Pt seen today in bed-  - awake/alert- pt cont to c/o pain and swelling  to RLE-no f/c + cough,  no nvd,  bm yest with bright red blood per RN- pt denies hemorrhoids stool sent for occult blood- urinating well,  appetite is ok, sleep ok- pt seeing ortho today  In therapy, pt is  Amb: unable w/c propels 20-60 ft mod I  Transfers total max  Bed mob: mod a  Pt has appt with Dr. Saha on 11/6 11/8/19  Pt seen today in bed-  - awake/alert- pt saw Dr. Saha- sts is going to have surgery within a week but is waiting to hear from the MD- sts pain is less today to RLE- is taking scheduled norco now no f/c/cp/sob occas cough urinating well,  appetite is ok, sleep ok-   In therapy, pt  is  Amb: unable   Transfers max  Bed mob: mod a    11/11/19  Pt seen today sitting up at beside  - awake/alert- sts had nose bleed over the weekend- bilat nares- also right foot/ankle was struck on bed while being transferred with magno- c/o pain, swelling to right foot and ankle- c/o right knee pain now no f/c/cp/occas sob occas cough urinating well,  appetite is ok, sleep ok-   In therapy, pt is  Amb: unable   Transfers max  Bed mob: mod a    11/12/19  Pt seen today sitting up at beside  - awake/alert-pt c/o pain to RLE- using expletives- informed xray neg for fx - asking \"what the f is wrong with my leg then\" informed need to f/u Dr. Saha to determine poc  no f/c/cp/occas sob occas cough urinating well,  appetite is ok, sleep ok-   In therapy, pt is  Amb: unable   Transfers max  Bed mob: mod a    11/13/19    Pt seen today sitting up at beside  - awake/alert-cont to  c/o pain to RLE- pt sts feeling better today- sore throat still present but not as bad no f/c/cp/occas sob occas cough urinating well,  appetite is ok, sleep ok-   In therapy, pt is  Amb: unable   Transfers max  Bed mob: mod a  Pt aware has cards appt 11/14- awaiting ortho plan for surgery in future    Pt lives home alone- has home MD      HISTORY  Past Medical History:   Diagnosis Date   • Acquired absence of left great toe (CMS/HCC) 6/10/2019   • Acute on chronic clinical systolic heart failure (CMS/HCC) 1/15/2019   • Amputated toe of left foot (CMS/HCC) 1/25/2019   • Anemia    • Atrial fibrillation (CMS/[facility])    • Chronic pain of both knees 8/22/2019   • Chronic recurrent major depressive disorder (CMS/[facility])    • Chronic systolic (congestive) heart failure (CMS/HCC) 3/4/2019   • COPD (chronic obstructive pulmonary disease) (CMS/[facility])    • Diabetic neuropathy, type II diabetes mellitus (CMS/[facility])    • Diabetic retinopathy (CMS/[facility])    • Diabetic ulcer of toe of left foot associated with diabetes mellitus due to underlying condition, with necrosis of  muscle (CMS/[facility]) 7/18/2018   • Dry gangrene (CMS/[facility]) 1/15/2019   • Encounter for long-term (current) use of insulin (CMS/[facility])    • Epistaxis 6/10/2019   • Gastric ulcer    • Great toe amputation status, left    • Head injury without concussion or intracranial hemorrhage 5/13/2019   • HLD (hyperlipidemia)    • Hypertensive heart and renal disease with congestive heart failure (CMS/[facility])    • MDRO (multiple drug resistant organisms) resistance 2/8/2018   • Morbid obesity (CMS/[facility])    • Nosebleed    • Opioid type dependence, continuous (CMS/[facility])    • YASEMIN (obstructive sleep apnea)    • S/P CABG x 1 5/30/2017   • Toe gangrene (CMS/[facility]) 1/25/2019   • Type 2 diabetes mellitus with vascular disease (CMS/[facility])    • Uncontrolled type 2 diabetes with retinopathy (CMS/[facility])    • Unspecified protein-calorie malnutrition (CMS/[facility])    • UTI (urinary tract infection)         Past Surgical History:   Procedure Laterality Date   • Coronary angioplasty with stent placement      2018   • Coronary artery bypass graft      2017   • Hb stent intravascular initial      2018, left leg   • Joint replacement      Bilateral knee replacement 2014,2015   • Pacemaker      2010   • Toe amputation       No family history on file.  History     Not marked as reviewed during this visit.          PROBLEM LIST:  Patient Active Problem List   Diagnosis   • CAD (coronary artery disease)   • Osteoarthritis of both knees   • Chronic obstructive pulmonary disease (CMS/[facility])   • CKD (chronic kidney disease) stage 3, GFR 30-59 ml/min (CMS/[facility])   • Debility   • Acute on chronic combined systolic (congestive) and diastolic (congestive) heart failure (CMS/[facility])   • PVD (peripheral vascular disease) (CMS/[facility])   • Advanced directives, counseling/discussion   • Benign hypertensive heart and kidney disease with CHF and stage 3 chronic kidney disease (CMS/[facility])   • Diabetes mellitus with circulatory complication (CMS/[facility])   • Diabetes mellitus with neuropathy (CMS/[facility])   •  Diabetes mellitus with retinopathy (CMS/[facility])   • Chronic pain   • Hyperlipidemia   • Long-term insulin use (CMS/[facility])   • Morbid obesity with BMI of 45.0-49.9, adult (CMS/[facility])   • Depression, recurrent (CMS/[facility])   • Pacemaker   • Venous stasis of lower extremity   • Opioid dependence, continuous (CMS/[facility])   • Protein-calorie malnutrition (CMS/[facility])   • Diabetes mellitus with stage 3 chronic kidney disease (CMS/[facility])   • Status post arterial stent   • Fall   • Acquired absence of left great toe (CMS/[facility])   • Other abnormalities of gait and mobility   • Acquired absence of other left toe(s) (CMS/[facility])   • Acquired absence of other right toe(s) (CMS/[facility])   • Epistaxis   • PNA (pneumonia)   • Anxiety   • Acute low back pain without sciatica   • Blind in both eyes   • Charcot's arthropathy   • Nontraumatic rupture of right quadriceps tendon   • Knee pain   • Anemia   • Pharyngitis, acute       ADVANCE DIRECTIVES:  Power of  Status:  Activated  Code Status:  Full Code and power of  for healthcare is activated  Goals of Care: return home    DEPRESSION SCREENING:  Recent PHQ 2/9 Score    PHQ 2:       PHQ 9:       DEPRESSION ASSESSMENT/PLAN:  Depression screening is negative no further plan needed.    ALLERGIES:  Allergies as of 11/13/2019   • (No Known Allergies)       CURRENT MEDICATIONS:   Current Outpatient Medications   Medication Sig Dispense Refill   • Blood Glucose Monitoring Suppl (ACCU-CHEK GUIDE) w/Device Kit AS DIRECTED 1 kit 0   • HYDROcodone-acetaminophen (NORCO)  MG per tablet Take 1 tablet by mouth every 6 hours.     • ferrous sulfate 325 (65 FE) MG tablet Take 325 mg by mouth 2 times daily.     • darbepoetin alejandra (ARANESP) 40 MCG/0.4ML injection Inject 40 mcg into the skin 1 day a week.     • HYDROcodone-acetaminophen (NORCO) 5-325 MG per tablet Take 1 tablet by mouth every 6 hours as needed for Pain.     • Acetaminophen-Codeine (TYLENOL WITH CODEINE #3 PO) Take 1 tablet by mouth every 4  hours as needed.     • traZODone (DESYREL) 50 MG tablet Take 1 tablet by mouth daily. 90 tablet 2   • aspirin (ECOTRIN) 81 MG EC tablet Take 81 mg by mouth daily.     • MAGNESIUM OXIDE PO Take 400 mg by mouth daily.     • metOLazone (ZAROXOLYN) 2.5 MG tablet Take 2.5 mg by mouth 1 day a week.     • potassium CHLORIDE (KLOR-CON M) 20 MEQ jossue ER tablet Take 20 mEq by mouth daily.     • DULoxetine (CYMBALTA) 60 MG capsule Take 1 capsule by mouth daily. (Patient taking differently: Take 90 mg by mouth. ) 90 capsule 1   • apixaBAN (ELIQUIS) 5 MG Tab Take 1 tablet by mouth every 12 hours. 60 tablet 2   • atorvastatin (LIPITOR) 40 MG tablet Take 1 tablet by mouth daily. 90 tablet 0   • bumetanide (BUMEX) 1 MG tablet Take 1 tablet by mouth 2 times daily. 180 tablet 0   • carvedilol (COREG) 6.25 MG tablet Take 1 tablet by mouth every 12 hours. 180 tablet 0   • clopidogrel (PLAVIX) 75 MG tablet Take 1 tablet by mouth daily. 90 tablet 0   • gabapentin (NEURONTIN) 100 MG capsule Take 2 capsules by mouth 2 times daily. 120 capsule 1   • insulin glargine (LANTUS SOLOSTAR) 100 UNIT/ML pen-injector Inject 29 Units into the skin 2 times daily. (Patient taking differently: Inject 20 Units into the skin. ) 15 mL 1   • pantoprazole (PROTONIX) 40 MG tablet Take 1 tablet by mouth daily. 90 tablet 0   • oxymetazoline (AFRIN) 0.05 % nasal spray 1 spray by Right Nare route 3 times daily. Indications: nosebleed     • albuterol (PROAIR RESPICLICK) 108 (90 Base) MCG/ACT inhaler Inhale 2 puffs into the lungs every 4 hours as needed.     • insulin lispro (HUMALOG KWIKPEN) 100 UNIT/ML pen-injector Inject 5 Units as directed 3 times daily. Take 10 unit Sq with meals.  If BS > 250 add 6 additional units to dose.     • nystatin (MYCOSTATIN) 926825 UNIT/GM ointment Apply 1 application topically daily as needed. to leg wound as needed      • acetaminophen (TYLENOL) 500 MG tablet Take 500 mg by mouth every 6 hours as needed for Pain.     • sodium  chloride (OCEAN) 0.65 % nasal spray Spray 1 spray in each nostril as needed for Congestion.     • albuterol-ipratropium 2.5 mg/0.5 mg (DUONEB) 0.5-2.5 (3) MG/3ML nebulizer solution Take 3 mLs by nebulization every 4 hours as needed.  0   • docusate sodium (COLACE) 100 MG capsule Take 100 mg by mouth 2 times daily as needed for Constipation.     • polyethylene glycol (MIRALAX) powder Take 17 g by mouth daily as needed.      • oxygen (O2) gas Inhale 2 L/min into the lungs continuous.       No current facility-administered medications for this visit.      Medications reviewed / reconciled: Yes    BASELINE FUNCTIONAL STATUS:  Walker and Wheelchair    CURRENT FUNCTIONAL STATUS:  Wheelchair    DIET:  Consistency: General   Type: DM diet, cardiac diet  Appetite: Normal    REVIEW OF SYSTEMS:  Review of Systems   Constitutional: Negative.    HENT: Positive for congestion, nosebleeds and sore throat.    Respiratory: Negative for cough.    Cardiovascular: Negative.         Sob w/ exertion   Gastrointestinal: Negative for constipation, diarrhea, nausea and vomiting.        See HPI   Genitourinary: Negative.    Musculoskeletal: Positive for joint swelling and myalgias.   Skin: Positive for color change.   All other systems reviewed and are negative.      VITALS:  Vitals:    11/13/19 1023   BP: 110/56   Pulse: 62   Resp: 16   Temp: 97.7 °F (36.5 °C)   PainSc: 5-6   PainLoc: Leg       PHYSICAL ASSESSMENT:  Physical Exam  Vitals signs and nursing note reviewed.   Constitutional:       Appearance: Normal appearance.   HENT:      Head: Atraumatic.      Mouth/Throat:      Pharynx: Oropharyngeal exudate and posterior oropharyngeal erythema present.   Neck:      Musculoskeletal: Neck supple.   Cardiovascular:      Rate and Rhythm: Normal rate and regular rhythm.   Pulmonary:      Effort: Pulmonary effort is normal.      Comments: Dim bilat  Abdominal:      General: Bowel sounds are normal.      Palpations: Abdomen is soft.    Musculoskeletal: Normal range of motion.         General: Swelling and tenderness present.      Right lower leg: Edema present.      Comments: Bruising to right knee, redness   Skin:     General: Skin is warm and dry.      Comments: Chronic skin changes BLE   Neurological:      General: No focal deficit present.      Mental Status: He is alert and oriented to person, place, and time.         LABS:  11/5/19  Wbc 7.73  H/h 7.7/24.5  plt 348  Bun/cr 67/1.71    10/29/19  Wbc 8.05  H/h 8.2/25.9  plt 419  Bun/cr 62/1.47    10/22/19  Wbc 8.01  H/h 8.9/27.2  plt 277  Glucose 214  Bun/cr 56/1.7  Na 133, cl 92 k 3.7    ASSESSMENT AND PLAN  Assessment   No problem-specific Assessment & Plan notes found for this encounter.  Acute on chronic clinical systolic heart failure (CMS/HCC)  Cont coreg, asa, statin, bumex, metolazone- held d/t worsening renal fx  Daily weights  Fluid restriction  CXR neg 11/3, CBC     Poss RLL PNA:  Competed  augmentin  RT/pulm to see  Maintain 02 sats >90%  Wean 02 as harsh  Med nebs   CXR neg 11/3,  added mucinex DM    Right quad tendon rupture:  Wear brace per ortho  Pain control  F/u ortho as sched  Pt sts is going tfor surgery- will need cardiac clearance- has appt with Dr. Chandler hancock- 11/14     Diabetes mellitus with stage 3 chronic kidney disease (CMS/HCC)uncontrolled  continue meds-lantus dose to 20units BID- increased to 24 units,  increased to 28 units   humalog  5 units TID w meals -  increased to 10 units TID  monitor accu checks  low CHO diet  Pt's FSG elevated- however pt very noncompliant with diet   Today fsg 109        Debility  Cont  Daily PT/OT  fall precautions  In therapy, pt is  Amb: unable   Transfers  max  Bed mob: mod a      CAD/Status post arterial stent  On plavix, statin, coreg  F/u cards     Benign hypertensive heart and kidney disease with CHF and stage 3 chronic kidney disease (CMS/HCC)  Cont med  low NA+ diet  hold for systolic <110  monitor closely  Avoid  nephrotoxic meds      Anemia:  Stool for occult blood neg  Monitor for any s/s bleeding  CBC - cont to monitor  FESO4 BID  aranesp 40 mg q week    Epistaxis: recurred last juan  Saline nasal spray  Monitor  On Eliquis will hold ir recurring    Right foot/ankle pain swelling after hit bed:  Xray today foot /ankle- neg for fx  Need f/u Dr. Saha    Pharyngitis:  Amoxicillin started x 7 days        DVT proph eliquis  GI PPI      FOLLOW UP APPOINTMENTS:  Cards, ortho    DISCHARGE PLANNING: need care plan for safe d/c    Prognosis: fair    Discussed with: Family, Patient and SW    Barriers to discharge: therapy needs    Anticipated disposition: Disposition Not Yet Determined    Total time spent is more than 25 minutes, with more than 50% of the time spent in coordination of care, counseling, review of records and discussion of plan of care with the patient /staff /family.   3

## 2020-09-11 NOTE — PHYSICAL THERAPY INITIAL EVALUATION ADULT - IMPAIRED TRANSFERS: SIT/STAND, REHAB EVAL
decreased strength/impaired postural control/narrow base of support/decreased endurance/impaired balance

## 2024-10-23 NOTE — ED ADULT TRIAGE NOTE - CHIEF COMPLAINT QUOTE
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 Perez  HTN - BP controlled on amlodipine 10, continue - Monitor BP at home, maintain log "I am very shaky in my legs since this morning and they are heavy. I felt SOB. I think my sugar was high" FS in ER is 204. Pt denies SOB at this time

## 2025-02-10 NOTE — DISCHARGE NOTE ADULT - FUNCTIONAL SCREEN CURRENT LEVEL: DRESSING, MLM
Increase ozempic to 1mg/week     (1) Humalog/Novolog - take this just before each meal        10 units before breakfast          12 units before dinner    Add on EXTRA Humalog/Novolog to your mealtime doses depending on your blood sugar reading as per the following sliding scale:    151-200 +1 unit  201-250 +2 unit   251-300 +3 unit   301-350 + 4 unit  351-400 + 5 unit  401-450 +6 unit    (2) Lantus 30 units in AM and 20 units in PM          -Take this around the same time every day no matter what (even if you haven't eaten)        -Do NOT adjust the dose until you have spoken to your doctor    (3) Check your blood sugars at least 4 times a day   Before each meal and at bedtime         -Anytime you feel \"low\"    (4) Call Mima Coyne DO office Dept: [phone] if your blood sugar goes below 70 or if it is above 300 and not coming down.    (5) Bring in your blood sugar readings or glucose meter to every appointment with Mima Coyne DO.     (0) independent